# Patient Record
Sex: MALE | Race: WHITE | Employment: UNEMPLOYED | URBAN - METROPOLITAN AREA
[De-identification: names, ages, dates, MRNs, and addresses within clinical notes are randomized per-mention and may not be internally consistent; named-entity substitution may affect disease eponyms.]

---

## 2021-09-16 ENCOUNTER — OFFICE VISIT (OUTPATIENT)
Dept: PRIMARY CARE CLINIC | Age: 31
End: 2021-09-16
Payer: MEDICARE

## 2021-09-16 ENCOUNTER — HOSPITAL ENCOUNTER (EMERGENCY)
Age: 31
Discharge: HOME | End: 2021-09-16
Payer: MEDICARE

## 2021-09-16 VITALS
TEMPERATURE: 96.6 F | DIASTOLIC BLOOD PRESSURE: 60 MMHG | WEIGHT: 144 LBS | OXYGEN SATURATION: 98 % | SYSTOLIC BLOOD PRESSURE: 110 MMHG | BODY MASS INDEX: 21.33 KG/M2 | HEART RATE: 77 BPM | HEIGHT: 69 IN

## 2021-09-16 VITALS
RESPIRATION RATE: 16 BRPM | DIASTOLIC BLOOD PRESSURE: 79 MMHG | SYSTOLIC BLOOD PRESSURE: 127 MMHG | OXYGEN SATURATION: 100 % | WEIGHT: 150 LBS | HEIGHT: 69 IN | HEART RATE: 79 BPM | BODY MASS INDEX: 22.22 KG/M2 | TEMPERATURE: 97.4 F

## 2021-09-16 DIAGNOSIS — S39.012A STRAIN OF LUMBAR REGION, INITIAL ENCOUNTER: Primary | ICD-10-CM

## 2021-09-16 DIAGNOSIS — Z00.00 HEALTHCARE MAINTENANCE: ICD-10-CM

## 2021-09-16 DIAGNOSIS — Z72.0 TOBACCO USE: ICD-10-CM

## 2021-09-16 DIAGNOSIS — M54.6 ACUTE BILATERAL THORACIC BACK PAIN: Primary | ICD-10-CM

## 2021-09-16 LAB
BILIRUBIN URINE: NEGATIVE
BLOOD, URINE: NEGATIVE
CLARITY: CLEAR
COLOR: YELLOW
GLUCOSE URINE: NEGATIVE MG/DL
KETONES, URINE: NEGATIVE MG/DL
LEUKOCYTE ESTERASE, URINE: NEGATIVE
MICROSCOPIC EXAMINATION: NORMAL
NITRITE, URINE: NEGATIVE
PH UA: 6 (ref 5–8)
PROTEIN UA: NEGATIVE MG/DL
SPECIFIC GRAVITY UA: 1.01 (ref 1–1.03)
URINE REFLEX TO CULTURE: NORMAL
URINE TYPE: NORMAL
UROBILINOGEN, URINE: 0.2 E.U./DL

## 2021-09-16 PROCEDURE — 4004F PT TOBACCO SCREEN RCVD TLK: CPT | Performed by: STUDENT IN AN ORGANIZED HEALTH CARE EDUCATION/TRAINING PROGRAM

## 2021-09-16 PROCEDURE — G8427 DOCREV CUR MEDS BY ELIG CLIN: HCPCS | Performed by: STUDENT IN AN ORGANIZED HEALTH CARE EDUCATION/TRAINING PROGRAM

## 2021-09-16 PROCEDURE — 99203 OFFICE O/P NEW LOW 30 MIN: CPT | Performed by: STUDENT IN AN ORGANIZED HEALTH CARE EDUCATION/TRAINING PROGRAM

## 2021-09-16 PROCEDURE — 87491 CHLMYD TRACH DNA AMP PROBE: CPT

## 2021-09-16 PROCEDURE — 99283 EMERGENCY DEPT VISIT LOW MDM: CPT

## 2021-09-16 PROCEDURE — 81003 URINALYSIS AUTO W/O SCOPE: CPT

## 2021-09-16 PROCEDURE — 87591 N.GONORRHOEAE DNA AMP PROB: CPT

## 2021-09-16 PROCEDURE — G8420 CALC BMI NORM PARAMETERS: HCPCS | Performed by: STUDENT IN AN ORGANIZED HEALTH CARE EDUCATION/TRAINING PROGRAM

## 2021-09-16 RX ORDER — BUPROPION HYDROCHLORIDE 150 MG/1
150 TABLET ORAL 2 TIMES DAILY
Qty: 60 TABLET | Refills: 2 | Status: SHIPPED | OUTPATIENT
Start: 2021-09-16 | End: 2021-09-24 | Stop reason: SINTOL

## 2021-09-16 RX ORDER — METHOCARBAMOL 500 MG/1
500 TABLET, FILM COATED ORAL 2 TIMES DAILY
Qty: 28 TABLET | Refills: 0 | Status: SHIPPED | OUTPATIENT
Start: 2021-09-16 | End: 2021-09-30

## 2021-09-16 RX ORDER — METHOCARBAMOL 500 MG/1
500 TABLET, FILM COATED ORAL 2 TIMES DAILY
Qty: 14 TABLET | Refills: 0 | Status: SHIPPED | OUTPATIENT
Start: 2021-09-16 | End: 2021-09-16

## 2021-09-16 SDOH — ECONOMIC STABILITY: FOOD INSECURITY: WITHIN THE PAST 12 MONTHS, YOU WORRIED THAT YOUR FOOD WOULD RUN OUT BEFORE YOU GOT MONEY TO BUY MORE.: NEVER TRUE

## 2021-09-16 SDOH — ECONOMIC STABILITY: FOOD INSECURITY: WITHIN THE PAST 12 MONTHS, THE FOOD YOU BOUGHT JUST DIDN'T LAST AND YOU DIDN'T HAVE MONEY TO GET MORE.: NEVER TRUE

## 2021-09-16 ASSESSMENT — ENCOUNTER SYMPTOMS
BACK PAIN: 1
SHORTNESS OF BREATH: 0
WHEEZING: 0
ABDOMINAL PAIN: 0

## 2021-09-16 ASSESSMENT — PAIN DESCRIPTION - PAIN TYPE: TYPE: ACUTE PAIN

## 2021-09-16 ASSESSMENT — PAIN SCALES - GENERAL: PAINLEVEL_OUTOF10: 2

## 2021-09-16 ASSESSMENT — PATIENT HEALTH QUESTIONNAIRE - PHQ9
1. LITTLE INTEREST OR PLEASURE IN DOING THINGS: 0
2. FEELING DOWN, DEPRESSED OR HOPELESS: 0
SUM OF ALL RESPONSES TO PHQ QUESTIONS 1-9: 0
SUM OF ALL RESPONSES TO PHQ9 QUESTIONS 1 & 2: 0

## 2021-09-16 ASSESSMENT — SOCIAL DETERMINANTS OF HEALTH (SDOH): HOW HARD IS IT FOR YOU TO PAY FOR THE VERY BASICS LIKE FOOD, HOUSING, MEDICAL CARE, AND HEATING?: NOT HARD AT ALL

## 2021-09-16 NOTE — PATIENT INSTRUCTIONS
Patient Education        Back Stretches: Exercises  Introduction  Here are some examples of exercises for stretching your back. Start each exercise slowly. Ease off the exercise if you start to have pain. Your doctor or physical therapist will tell you when you can start these exercises and which ones will work best for you. How to do the exercises  Overhead stretch   1. Stand comfortably with your feet shoulder-width apart. 2. Looking straight ahead, raise both arms over your head and reach toward the ceiling. Do not allow your head to tilt back. 3. Hold for 15 to 30 seconds, then lower your arms to your sides. 4. Repeat 2 to 4 times. Side stretch   1. Stand comfortably with your feet shoulder-width apart. 2. Raise one arm over your head, and then lean to the other side. 3. Slide your hand down your leg as you let the weight of your arm gently stretch your side muscles. Hold for 15 to 30 seconds. 4. Repeat 2 to 4 times on each side. Press-up   1. Lie on your stomach, supporting your body with your forearms. 2. Press your elbows down into the floor to raise your upper back. As you do this, relax your stomach muscles and allow your back to arch without using your back muscles. As your press up, do not let your hips or pelvis come off the floor. 3. Hold for 15 to 30 seconds, then relax. 4. Repeat 2 to 4 times. Relax and rest   1. Lie on your back with a rolled towel under your neck and a pillow under your knees. Extend your arms comfortably to your sides. 2. Relax and breathe normally. 3. Remain in this position for about 10 minutes. 4. If you can, do this 2 or 3 times each day. Follow-up care is a key part of your treatment and safety. Be sure to make and go to all appointments, and call your doctor if you are having problems. It's also a good idea to know your test results and keep a list of the medicines you take. Where can you learn more? Go to https://carmine.[employer]. org and sign in to your MyToons account. Enter Z102 in the [employer] box to learn more about \"Back Stretches: Exercises. \"     If you do not have an account, please click on the \"Sign Up Now\" link. Current as of: November 16, 2020               Content Version: 12.9  © 2006-2021 Healthwise, Incorporated. Care instructions adapted under license by [facility] ([facility]). If you have questions about a medical condition or this instruction, always ask your healthcare professional. [place] [address] any warranty or liability for your use of this information. Patient Education        Stopping Smoking: Care Instructions  Your Care Instructions     Cigarette smokers crave the nicotine in cigarettes. Giving it up is much harder than simply changing a habit. Your body has to stop craving the nicotine. It is hard to quit, but you can do it. There are many tools that people use to quit smoking. You may find that combining tools works best for you. There are several steps to quitting. First you get ready to quit. Then you get support to help you. After that, you learn new skills and behaviors to become a nonsmoker. For many people, a necessary step is getting and using medicine. Your doctor will help you set up the plan that best meets your needs. You may want to attend a smoking cessation program to help you quit smoking. When you choose a program, look for one that has proven success. Ask your doctor for ideas. You will greatly increase your chances of success if you take medicine as well as get counseling or join a cessation program.  Some of the changes you feel when you first quit tobacco are uncomfortable. Your body will miss the nicotine at first, and you may feel short-tempered and grumpy. You may have trouble sleeping or concentrating. Medicine can help you deal with these symptoms. You may struggle with changing your smoking habits and rituals. The last step is the tricky one:  Be prepared for the smoking urge to continue for a time. This is a lot to deal with, but keep at it. You will feel better. Follow-up care is a key part of your treatment and safety. Be sure to make and go to all appointments, and call your doctor if you are having problems. It's also a good idea to know your test results and keep a list of the medicines you take. How can you care for yourself at home? · Ask your family, friends, and coworkers for support. You have a better chance of quitting if you have help and support. · Join a support group, such as Nicotine Anonymous, for people who are trying to quit smoking. · Consider signing up for a smoking cessation program, such as the American Lung Association's Freedom from Smoking program.  · Get text messaging support. Go to the website at www.smokefree. gov to sign up for the [facility] program.  · Set a quit date. Pick your date carefully so that it is not right in the middle of a big deadline or stressful time. Once you quit, do not even take a puff. Get rid of all ashtrays and lighters after your last cigarette. Clean your house and your clothes so that they do not smell of smoke. · Learn how to be a nonsmoker. Think about ways you can avoid those things that make you reach for a cigarette. ? Avoid situations that put you at greatest risk for smoking. For some people, it is hard to have a drink with friends without smoking. For others, they might skip a coffee break with coworkers who smoke. ? Change your daily routine. Take a different route to work or eat a meal in a different place. · Cut down on stress. Calm yourself or release tension by doing an activity you enjoy, such as reading a book, taking a hot bath, or gardening. · Talk to your doctor or pharmacist about nicotine replacement therapy, which replaces the nicotine in your body.  You still get nicotine but you do not use tobacco. Nicotine replacement products help you slowly reduce the amount of nicotine you need. These products come in several forms, many of them available over-the-counter:  ? Nicotine patches  ? Nicotine gum and lozenges  ? Nicotine inhaler  · Ask your doctor about bupropion (Wellbutrin) or varenicline (Chantix), which are prescription medicines. They do not contain nicotine. They help you by reducing withdrawal symptoms, such as stress and anxiety. · Some people find hypnosis, acupuncture, and massage helpful for ending the smoking habit. · Eat a healthy diet and get regular exercise. Having healthy habits will help your body move past its craving for nicotine. · Be prepared to keep trying. Most people are not successful the first few times they try to quit. Do not get mad at yourself if you smoke again. Make a list of things you learned and think about when you want to try again, such as next week, next month, or next year. Where can you learn more? Go to https://ConnectSolutions.[employer]. org and sign in to your [employer] account. Enter E837 in the [employer] box to learn more about \"Stopping Smoking: Care Instructions. \"     If you do not have an account, please click on the \"Sign Up Now\" link. Current as of: February 11, 2021               Content Version: 12.9  © 2006-2021 Healthwise, Incorporated. Care instructions adapted under license by [facility] ([facility]). If you have questions about a medical condition or this instruction, always ask your healthcare professional. [place] [address] any warranty or liability for your use of this information. Wellbutrin dose (May be used by itself or in combination with nicotine replacement therapy):     Initial: 150 mg (1 tablet) once daily for 3 days     Maintenance (day 4 and on): increase to 150 mg twice daily; if cannot tolerate twice a day dose, consider continuing 150 mg once daily    Therapy should begin at least 1 week before target quit date. Target quit dates are generally in the second week of treatment. If patient successfully quits smoking, continue treatment for at least 12 weeks.

## 2021-09-16 NOTE — ASSESSMENT & PLAN NOTE
Patient reports to have tried nicotine patch and lozenges in the past without help with smoking cessation. He is agreeable to try bupropion. Instructions given about how to use it.

## 2021-09-16 NOTE — ASSESSMENT & PLAN NOTE
This is likely secondary to muscle strain. Patient was recommended to take the diclofenac and the Robaxin as prescribed by the ED. Patient was also provided with back stretching exercises he can do in the meantime. Imaging not needed at this time.   PT will be a consideration in the future if symptoms persist.

## 2021-09-16 NOTE — ED PROVIDER NOTES
Cigarettes    Smokeless tobacco: Never Used   Vaping Use    Vaping Use: Every day   Substance and Sexual Activity    Alcohol use: No    Drug use: Yes     Types: Marijuana, Cocaine    Sexual activity: Not on file   Other Topics Concern    Not on file   Social History Narrative    Not on file     Social Determinants of Health     Financial Resource Strain:     Difficulty of Paying Living Expenses:    Food Insecurity:     Worried About Running Out of Food in the Last Year:     Ran Out of Food in the Last Year:    Transportation Needs:     Lack of Transportation (Medical):  Lack of Transportation (Non-Medical):    Physical Activity:     Days of Exercise per Week:     Minutes of Exercise per Session:    Stress:     Feeling of Stress :    Social Connections:     Frequency of Communication with Friends and Family:     Frequency of Social Gatherings with Friends and Family:     Attends Scientologist Services:     Active Member of Clubs or Organizations:     Attends Club or Organization Meetings:     Marital Status:    Intimate Partner Violence:     Fear of Current or Ex-Partner:     Emotionally Abused:     Physically Abused:     Sexually Abused:      No current facility-administered medications for this encounter. Current Outpatient Medications   Medication Sig Dispense Refill    fluticasone (FLONASE) 50 MCG/ACT nasal spray 1 spray by Nasal route daily. 1 Bottle 0     No Known Allergies    REVIEW OF SYSTEMS  6 systems reviewed, pertinent positives per HPI otherwise noted to be negative    PHYSICAL EXAM  /79   Pulse 79   Temp 97.4 °F (36.3 °C) (Oral)   Resp 16   Ht 5' 9\" (1.753 m)   Wt 150 lb (68 kg)   SpO2 100%   BMI 22.15 kg/m²   GENERAL APPEARANCE: Awake and alert. Cooperative. No acute distress. HEAD: Normocephalic. Atraumatic. EYES: PERRL. EOM's grossly intact. ENT: Mucous membranes are moist.   NECK: Supple. Normal ROM. CHEST: Equal symmetric chest rise.    LUNGS: Breathing is unlabored. Speaking comfortably in full sentences. Abdomen: Nondistended and nontender. No rigidity, guarding, rebound. No midline pulsatile mass. BACK: On exam of cervical, thoracic, lumbar spine, there is no swelling, bruising, or color change noted. There is no midline bony tenderness, without crepitus, deformity, or step off. Patient exhibits tenderness of bilateral lumbar paraspinal muscles. Of midline. There is no point tenderness over the SI Joint. Full active range of motion of flexion, extension, lateral bending the back. EXTREMITIES: MAEE. No acute deformities. SKIN: Warm and dry. Rashes, clubbing, or cyanosis. NEUROLOGICAL: Alert and oriented. Strength is 5/5 in all extremities and sensation is intact. Patellar DTRs +2 bilaterally. Patient observed ambulating to the emergency department without difficulty. RADIOLOGY  No results found. CLINICAL CONCERN FOR ACUTE SPINAL EMERGENCIES:   1. Suspicion: Medium-Low Suspicion-evaluate Risk factors   2. Risk Factors:Minor: 0 Major: 0   Score: 0 0-2-No Further Testing       ED COURSE   I have independently evaluated this patient. Patient presented with concerns of bilateral low back pain that is not reproducible, he is primarily asymptomatic while in the emergency department only rates the pain is a 2/10. At this time a do not think urgent care prescription of Pepcid is appropriate at this time. Patient has completely benign physical exam including neurological exam.  Hemodynamically stable. Urinalysis was performed to evaluate for possible cystitis, although he has a benign abdominal pain and no CVA tenderness. Urinalysis without evidence of infection. Due to the patient reporting an oily-like discharge I did order urine gonorrhea chlamydia screening exam.    A discussion was had with the patient regarding urinalysis results.   I advised him to discontinue the Prilosec if he is not experiencing any acid reflux or abdominal discomfort. Informed her that at this time he has no evidence of UTI. He will be treated with diclofenac and Robaxin for presumed muscle strain. I did advise the patient to follow-up with primary care physician in 48 to 72 hours although, it is a Thursday therefore Monday would be appropriate. He has not established with a primary care therefore he will be provided a referral.  Risk management discussed and shared decision making had with patient and/or surrogate. All questions were answered. Patient is given very strict return precautions and patient will return to ED for new/worsening symptoms. Patient was sent home with a prescription for diclofenac and Robaxin. MDM  Results for orders placed or performed during the hospital encounter of 09/16/21   Urinalysis Reflex to Culture    Specimen: Urine, clean catch   Result Value Ref Range    Color, UA Yellow Straw/Yellow    Clarity, UA Clear Clear    Glucose, Ur Negative Negative mg/dL    Bilirubin Urine Negative Negative    Ketones, Urine Negative Negative mg/dL    Specific Gravity, UA 1.010 1.005 - 1.030    Blood, Urine Negative Negative    pH, UA 6.0 5.0 - 8.0    Protein, UA Negative Negative mg/dL    Urobilinogen, Urine 0.2 <2.0 E.U./dL    Nitrite, Urine Negative Negative    Leukocyte Esterase, Urine Negative Negative    Microscopic Examination Not Indicated     Urine Type NotGiven     Urine Reflex to Culture Not Indicated        I estimate there is LOW risk for ABDOMINAL AORTIC ANEURYSM, CAUDA EQUINA SYNDROME, EPIDURAL MASS LESION, SPINAL STENOSIS, OR HERNIATED DISK CAUSING SEVERE STENOSIS, thus I consider the discharge disposition reasonable. Izzy Galeana and I have discussed the diagnosis and risks, and we agree with discharging home to follow-up with their primary doctor. We also discussed returning to the Emergency Department immediately if new or worsening symptoms occur.  We have discussed the symptoms which are most concerning (e.g., saddle anesthesia, urinary or bowel incontinence or retention, changing or worsening pain) that necessitate immediate return. Final Impression    1. Strain of lumbar region, initial encounter        Blood pressure 127/79, pulse 79, temperature 97.4 °F (36.3 °C), temperature source Oral, resp. rate 16, height 5' 9\" (1.753 m), weight 150 lb (68 kg), SpO2 100 %. DISPOSITION  Patient was discharged to home in good condition.           Roger Allen  09/16/21 [id number]

## 2021-09-16 NOTE — PROGRESS NOTES
Mata Ying (:  1990) is a 35 y.o. male,New patient, here for evaluation of the following chief complaint(s):  New Patient (Np to establish ) and Back Pain (pt c/o lower back pain x week)      Patient presents today for ED follow-up. Patient is also here to establish care. Patient was seen in the ED earlier today with complaints of back pain. He was determined to have benign lumbar strain. He was prescribed diclofenac and Robaxin recommended to establish care with a PCP within the next few days. Patient was also assessed for UTI but UA was unremarkable. Patient reports bilateral back pain, worse with movement, starting about a week ago, constant, 3/10 currently (but worse in the morning at night). Patient is a , reporting to lift heavy objects for work, however he is not sure that this is associated with specific movement. Patient also reports upper back muscle spasm and tightness that sometimes radiates to his left shoulder and and arm, intermittent. Patient denies any past medical history. Patient denies any past surgical history. Family history reviewed. Patient reports smoking 0.5 pack of cigarettes a day, denies alcohol drinking, denies recreational drug use. Patient is not monogamous relationship. ASSESSMENT/PLAN:  1. Acute bilateral thoracic back pain  Assessment & Plan: This is likely secondary to muscle strain. Patient was recommended to take the diclofenac and the Robaxin as prescribed by the ED. Patient was also provided with back stretching exercises he can do in the meantime. Imaging not needed at this time. PT will be a consideration in the future if symptoms persist.  2. Healthcare maintenance  -     methocarbamol (ROBAXIN) 500 MG tablet; Take 1 tablet by mouth 2 times daily for 14 days, Disp-28 tablet, R-0Normal  -     CBC Auto Differential; Future  -     COMPREHENSIVE METABOLIC PANEL; Future  3.  Tobacco use  Assessment & Plan:   Patient reports to have tried nicotine patch and lozenges in the past without help with smoking cessation. He is agreeable to try bupropion. Instructions given about how to use it. Orders:  -     buPROPion (WELLBUTRIN XL) 150 MG extended release tablet; Take 1 tablet by mouth 2 times daily, Disp-60 tablet, R-2Normal      Return in about 4 weeks (around 10/14/2021) for back pain follow up / physical .    SUBJECTIVE/OBJECTIVE:    Review of Systems   Constitutional: Negative for fever. Respiratory: Negative for shortness of breath and wheezing. Cardiovascular: Negative for chest pain and palpitations. Gastrointestinal: Negative for abdominal pain. Musculoskeletal: Positive for back pain. Physical Exam  Constitutional:       Appearance: Normal appearance. Cardiovascular:      Rate and Rhythm: Normal rate and regular rhythm. Pulses: Normal pulses. Heart sounds: Normal heart sounds. Pulmonary:      Effort: Pulmonary effort is normal.      Breath sounds: Normal breath sounds. Musculoskeletal:      Comments: No deformities noted, no tenderness to palpation noted   Neurological:      Mental Status: He is alert and oriented to person, place, and time. Comments: Negative Spurling test, normal equal and bilateral sensation in bilateral upper extremities   Psychiatric:         Mood and Affect: Mood normal.         Behavior: Behavior normal.           An electronic signature was used to authenticate this note. Electronically signed by Tiffanie Mckeon MD on 9/16/2021 at 4:51 PM     This dictation was generated by voice recognition computer software. Although all attempts are made to edit the dictation for accuracy, there may be errors in the transcription that are not intended.

## 2021-09-17 LAB
C. TRACHOMATIS DNA ,URINE: NEGATIVE
N. GONORRHOEAE DNA, URINE: NEGATIVE

## 2021-09-24 ENCOUNTER — TELEPHONE (OUTPATIENT)
Dept: PRIMARY CARE CLINIC | Age: 31
End: 2021-09-24

## 2021-09-24 DIAGNOSIS — Z72.0 TOBACCO USE: Primary | ICD-10-CM

## 2021-09-24 RX ORDER — VARENICLINE TARTRATE 1 MG/1
1 TABLET, FILM COATED ORAL 2 TIMES DAILY
Qty: 60 TABLET | Refills: 2 | Status: SHIPPED | OUTPATIENT
Start: 2021-09-24 | End: 2022-05-23

## 2021-09-27 ENCOUNTER — TELEPHONE (OUTPATIENT)
Dept: PRIMARY CARE CLINIC | Age: 31
End: 2021-09-27

## 2021-09-27 NOTE — TELEPHONE ENCOUNTER
Faxed received from [facility] requesting alternative to Chantix - pharmacy states that Chantix is backordered and has also been recalled.

## 2021-09-27 NOTE — TELEPHONE ENCOUNTER
I called the patient back and talked to his wife. She reports that he is going to try continuing to take the patches and / or consider resuming the bupropion and only taking it once a day.  I also suggested some smoking cessation programs, for which we could provide them contact list.     Nahum Johnson MD

## 2021-10-16 PROBLEM — Z00.00 HEALTHCARE MAINTENANCE: Status: RESOLVED | Noted: 2021-09-16 | Resolved: 2021-10-16

## 2022-05-23 ENCOUNTER — OFFICE VISIT (OUTPATIENT)
Dept: PRIMARY CARE CLINIC | Age: 32
End: 2022-05-23
Payer: MEDICARE

## 2022-05-23 ENCOUNTER — NURSE TRIAGE (OUTPATIENT)
Dept: OTHER | Facility: CLINIC | Age: 32
End: 2022-05-23

## 2022-05-23 VITALS
SYSTOLIC BLOOD PRESSURE: 106 MMHG | HEART RATE: 93 BPM | OXYGEN SATURATION: 97 % | WEIGHT: 136 LBS | DIASTOLIC BLOOD PRESSURE: 64 MMHG | BODY MASS INDEX: 20.14 KG/M2 | TEMPERATURE: 97.9 F | HEIGHT: 69 IN

## 2022-05-23 DIAGNOSIS — M54.31 SCIATICA OF RIGHT SIDE: Primary | ICD-10-CM

## 2022-05-23 PROCEDURE — G8427 DOCREV CUR MEDS BY ELIG CLIN: HCPCS | Performed by: STUDENT IN AN ORGANIZED HEALTH CARE EDUCATION/TRAINING PROGRAM

## 2022-05-23 PROCEDURE — 99213 OFFICE O/P EST LOW 20 MIN: CPT | Performed by: STUDENT IN AN ORGANIZED HEALTH CARE EDUCATION/TRAINING PROGRAM

## 2022-05-23 PROCEDURE — 4004F PT TOBACCO SCREEN RCVD TLK: CPT | Performed by: STUDENT IN AN ORGANIZED HEALTH CARE EDUCATION/TRAINING PROGRAM

## 2022-05-23 PROCEDURE — G8420 CALC BMI NORM PARAMETERS: HCPCS | Performed by: STUDENT IN AN ORGANIZED HEALTH CARE EDUCATION/TRAINING PROGRAM

## 2022-05-23 RX ORDER — METHOCARBAMOL 500 MG/1
500 TABLET, FILM COATED ORAL 4 TIMES DAILY
COMMUNITY

## 2022-05-23 RX ORDER — GABAPENTIN 600 MG/1
300 TABLET ORAL NIGHTLY PRN
Qty: 15 TABLET | Refills: 0 | Status: SHIPPED | OUTPATIENT
Start: 2022-05-23 | End: 2022-06-22

## 2022-05-23 RX ORDER — IBUPROFEN 200 MG
200 TABLET ORAL EVERY 6 HOURS PRN
COMMUNITY

## 2022-05-23 ASSESSMENT — ENCOUNTER SYMPTOMS
ABDOMINAL PAIN: 0
BACK PAIN: 1
WHEEZING: 0
SHORTNESS OF BREATH: 0

## 2022-05-23 NOTE — PROGRESS NOTES
Cleophas Lanes (:  1990) is a 28 y.o. male,Established patient, here for evaluation of the following chief complaint(s):  Leg Pain (down right leg x1 week)      Patient presents for same day visit with complaints of leg pain. Patient reports that a week ago he was moving a car motor which was heavy and hurt his back. After 4 days it went away but afterwards started feeling \"shocks moving down his R leg\". This is worse with knee extension and hip flexion. He also experiencing numbness in his R ankle. ASSESSMENT/PLAN:  1. Sciatica of right side  -     gabapentin (NEURONTIN) 600 MG tablet; Take 0.5 tablets by mouth nightly as needed (low back / sciatica) for up to 30 days. , Disp-15 tablet, R-0Normal  Patient's symptoms are suggestive of sciatica, likely from lumbar pinch nerve caused by lifting heavy object 1 week ago. This is likely going to be temporary. Short trial of gabapentin and home back stretching exercises. Return if symptoms worsen or fail to improve. SUBJECTIVE/OBJECTIVE:    Review of Systems   Constitutional: Negative for fever. Respiratory: Negative for shortness of breath and wheezing. Cardiovascular: Negative for chest pain and palpitations. Gastrointestinal: Negative for abdominal pain. Musculoskeletal: Positive for back pain. Neurological: Negative. Vitals: /64   Pulse 93   Temp 97.9 °F (36.6 °C)   Ht 5' 9\" (1.753 m)   Wt 136 lb (61.7 kg)   SpO2 97%   BMI 20.08 kg/m²   Physical Exam  Constitutional:       General: He is not in acute distress. Appearance: Normal appearance. He is not ill-appearing or toxic-appearing. Musculoskeletal:      Comments: No tenderness to palpation on lower back. Positive right straight leg raise test.   Neurological:      Mental Status: He is alert and oriented to person, place, and time.    Psychiatric:         Mood and Affect: Mood normal.         Behavior: Behavior normal.           No results found for this visit on 05/23/22. An electronic signature was used to authenticate this note. Electronically signed by Elder Aschoff, MD on 5/23/2022 at 5:00 PM     This dictation was generated by voice recognition computer software. Although all attempts are made to edit the dictation for accuracy, there may be errors in the transcription that are not intended.

## 2022-05-23 NOTE — LETTER
[facility]  [address] [place] [address]  Phone: [phone]  Fax: [phone]    Arnel Cazares MD        May 23, 2022     Patient: Darshana Malagon   YOB: 1990   Date of Visit: 5/23/2022       To Whom It May Concern:    Darshana Malagon was seen in my office today. It is my medical opinion that Darshana Malagon may return to light duty immediately with the following restrictions: lifting/carrying not to exceed 20 lbs. , pushing/pulling not to exceed 20 lbs., Duration of restrictions (days):  14.    If you have any questions or concerns, please don't hesitate to call.     Sincerely,        Arnel Cazares MD

## 2022-05-23 NOTE — PATIENT INSTRUCTIONS
Patient Education        Sciatica: Care Instructions  Your Care Instructions     Sciatica (say \"jxk-RA-pg-kuh\") is an irritation of one of the sciatic nerves, which come from the spinal cord in the lower back. The sciatic nerves and their branches extend down through the buttock to the foot. Sciatica can develop when an injured disc in the back irritates or presses against a spinal nerve root. Its main symptom is pain, numbness, or weakness that is often worse in the legor foot than in the back. Sciatica often will improve and go away with time. Early treatment usuallyincludes medicines and exercises to relieve pain. Follow-up care is a key part of your treatment and safety. Be sure to make and go to all appointments, and call your doctor if you are having problems. It's also a good idea to know your test results and keep alist of the medicines you take. How can you care for yourself at home?  Take pain medicines exactly as directed. ? If the doctor gave you a prescription medicine for pain, take it as prescribed. ? If you are not taking a prescription pain medicine, ask your doctor if you can take an over-the-counter medicine.  Use heat or ice to relieve pain. ? To apply heat, put a warm water bottle, heating pad set on low, or warm cloth on your back. Do not go to sleep with a heating pad on your skin. ? To use ice, put ice or a cold pack on the area for 10 to 20 minutes at a time. Put a thin cloth between the ice and your skin.  Avoid sitting if possible, unless it feels better than standing.  Alternate lying down with short walks. Increase your walking distance as you are able to without making your symptoms worse.  Do not do anything that makes your symptoms worse. When should you call for help? Call 911 anytime you think you may need emergency care. For example, call if:     You are unable to move a leg at all.    Call your doctor now or seek immediate medical care if:     You have new or worse symptoms in your legs or buttocks. Symptoms may include:  ? Numbness or tingling. ? Weakness. ? Pain.      You lose bladder or bowel control. Watch closely for changes in your health, and be sure to contact your doctor if:     You are not getting better as expected. Where can you learn more? Go to https://chpepiceweb.[employer]. org and sign in to your [email or web address] account. Enter [phone] in the [employer] box to learn more about \"Sciatica: Care Instructions. \"     If you do not have an account, please click on the \"Sign Up Now\" link. Current as of: July 1, 2021               Content Version: 13.2  © 2006-2022 [employer]. Care instructions adapted under license by [employer] [facility] ([facility]). If you have questions about a medical condition or this instruction, always ask your healthcare professional. [place] [address] any warranty or liability for your use of this information. Patient Education        Back Stretches: Exercises  Introduction  Here are some examples of exercises for stretching your back. Start eachexercise slowly. Ease off the exercise if you start to have pain. Your doctor or physical therapist will tell you when you can start theseexercises and which ones will work best for you. How to do the exercises  Overhead stretch    1. Stand comfortably with your feet shoulder-width apart. 2. Looking straight ahead, raise both arms over your head and reach toward the ceiling. Do not allow your head to tilt back. 3. Hold for 15 to 30 seconds, then lower your arms to your sides. 4. Repeat 2 to 4 times. Side stretch    1. Stand comfortably with your feet shoulder-width apart. 2. Raise one arm over your head, and then lean to the other side. 3. Slide your hand down your leg as you let the weight of your arm gently stretch your side muscles. Hold for 15 to 30 seconds. 4. Repeat 2 to 4 times on each side. Press-up    1.  Lie on your stomach, supporting your body with your forearms. 2. Press your elbows down into the floor to raise your upper back. As you do this, relax your stomach muscles and allow your back to arch without using your back muscles. As your press up, do not let your hips or pelvis come off the floor. 3. Hold for 15 to 30 seconds, then relax. 4. Repeat 2 to 4 times. Relax and rest    1. Lie on your back with a rolled towel under your neck and a pillow under your knees. Extend your arms comfortably to your sides. 2. Relax and breathe normally. 3. Remain in this position for about 10 minutes. 4. If you can, do this 2 or 3 times each day. Follow-up care is a key part of your treatment and safety. Be sure to make and go to all appointments, and call your doctor if you are having problems. It's also a good idea to know your test results and keep alist of the medicines you take. Where can you learn more? Go to https://C2C Link.[employer]. org and sign in to your [employer] account. Enter R427 in the [employer] box to learn more about \"Back Stretches: Exercises. \"     If you do not have an account, please click on the \"Sign Up Now\" link. Current as of: July 1, 2021               Content Version: 13.2  © 2006-2022 [employer]. Care instructions adapted under license by [facility] ([facility]). If you have questions about a medical condition or this instruction, always ask your healthcare professional. [place] [address] any warranty or liability for your use of this information.

## 2022-05-23 NOTE — TELEPHONE ENCOUNTER
Received call from [address] [place] [address] at [facility] with Red Flag Complaint. Subjective: Caller states \"My right leg is hurting in the hamstring. It feels like it's pulling. It doesn't hurt all the time but like when I'm bending over or in certain positions. \"    Current Symptoms: R hamstring pain    Onset: 1 week    Associated Symptoms: denies numbness and tingling, admits to some bruising and swelling    Pain Severity: 6/10    Temperature: denies fever    What has been tried: ibuprofen    LMP: NA Pregnant: NA    Recommended disposition: See in Office Today or Tomorrow    Care advice provided, patient verbalizes understanding; denies any other questions or concerns; instructed to call back for any new or worsening symptoms. Patient/Caller agrees with recommended disposition; writer provided warm transfer to [employer] at [facility] for appointment scheduling     Attention Provider: Thank you for allowing me to participate in the care of your patient. The patient was connected to triage in response to information provided to the ECC/PSC. Please do not respond through this encounter as the response is not directed to a shared pool.         Reason for Disposition   Patient wants to be seen    Protocols used: LEG PAIN-ADULT-[place]

## 2022-06-08 ENCOUNTER — TELEPHONE (OUTPATIENT)
Dept: PRIMARY CARE CLINIC | Age: 32
End: 2022-06-08

## 2022-06-08 DIAGNOSIS — M54.31 SCIATICA OF RIGHT SIDE: Primary | ICD-10-CM

## 2022-06-08 NOTE — TELEPHONE ENCOUNTER
I called the number provided and talked to patient's girlfriend Geri Escudero. Patient was seen by me about 2 weeks ago with complaints of left leg pain suggestive of sciatica symptoms (started with back injury). I prescribed him gabapentin and Geri Escudero admits he is not consistent in taking it as prescribed. She reports that Prudence Lopez continues to have leg pain and is requesting for referral to pain specialist. Due to proximity to his residence, she suggested Dr. Pato Prasad. Referral will be faxed to his office.     Zena Richard MD

## 2022-06-08 NOTE — TELEPHONE ENCOUNTER
----- Message from Torie Lopez sent at 6/8/2022  9:35 AM EDT -----  Subject: Message to Provider    QUESTIONS  Information for Provider? Pt would like to know if he needs to be seen for   a Pain Management Dr or if he can just get a referral. Please call pt's   Girlfriend Kenzie Gutierres [phone].   ---------------------------------------------------------------------------  --------------  [address]  What is the best way for the office to contact you? Do not leave any   message, patient will call back for answer  Preferred Call Back Phone Number? [phone]  ---------------------------------------------------------------------------  --------------  SCRIPT ANSWERS  Relationship to Patient?  Self

## 2022-12-24 ENCOUNTER — APPOINTMENT (OUTPATIENT)
Dept: GENERAL RADIOLOGY | Age: 32
End: 2022-12-24
Payer: MEDICARE

## 2022-12-24 ENCOUNTER — HOSPITAL ENCOUNTER (EMERGENCY)
Age: 32
Discharge: HOME | End: 2022-12-24
Attending: EMERGENCY MEDICINE
Payer: MEDICARE

## 2022-12-24 VITALS
DIASTOLIC BLOOD PRESSURE: 70 MMHG | SYSTOLIC BLOOD PRESSURE: 138 MMHG | HEIGHT: 69 IN | BODY MASS INDEX: 20.15 KG/M2 | WEIGHT: 136.02 LBS | HEART RATE: 88 BPM | TEMPERATURE: 98 F | OXYGEN SATURATION: 100 % | RESPIRATION RATE: 18 BRPM

## 2022-12-24 DIAGNOSIS — R44.0 HEARING VOICES: ICD-10-CM

## 2022-12-24 DIAGNOSIS — R00.0 TACHYCARDIA: Primary | ICD-10-CM

## 2022-12-24 DIAGNOSIS — F15.10 METHAMPHETAMINE USE (HCC): ICD-10-CM

## 2022-12-24 LAB
A/G RATIO: 1.8 (ref 1.1–2.2)
ACETAMINOPHEN LEVEL: <5 UG/ML (ref 10–30)
ALBUMIN SERPL-MCNC: 4.6 G/DL (ref 3.4–5)
ALP BLD-CCNC: 96 U/L (ref 40–129)
ALT SERPL-CCNC: 25 U/L (ref 10–40)
AMPHETAMINE SCREEN, URINE: POSITIVE
ANION GAP SERPL CALCULATED.3IONS-SCNC: 10 MMOL/L (ref 3–16)
AST SERPL-CCNC: 19 U/L (ref 15–37)
BARBITURATE SCREEN URINE: ABNORMAL
BASOPHILS ABSOLUTE: 0 K/UL (ref 0–0.2)
BASOPHILS RELATIVE PERCENT: 0.5 %
BENZODIAZEPINE SCREEN, URINE: ABNORMAL
BILIRUB SERPL-MCNC: <0.2 MG/DL (ref 0–1)
BILIRUBIN URINE: NEGATIVE
BLOOD, URINE: NEGATIVE
BUN BLDV-MCNC: 16 MG/DL (ref 7–20)
CALCIUM SERPL-MCNC: 9.5 MG/DL (ref 8.3–10.6)
CANNABINOID SCREEN URINE: ABNORMAL
CHLORIDE BLD-SCNC: 102 MMOL/L (ref 99–110)
CLARITY: CLEAR
CO2: 28 MMOL/L (ref 21–32)
COCAINE METABOLITE SCREEN URINE: ABNORMAL
COLOR: YELLOW
CREAT SERPL-MCNC: 0.8 MG/DL (ref 0.9–1.3)
EKG ATRIAL RATE: 105 BPM
EKG DIAGNOSIS: NORMAL
EKG P AXIS: 78 DEGREES
EKG P-R INTERVAL: 152 MS
EKG Q-T INTERVAL: 324 MS
EKG QRS DURATION: 88 MS
EKG QTC CALCULATION (BAZETT): 428 MS
EKG R AXIS: 92 DEGREES
EKG T AXIS: 63 DEGREES
EKG VENTRICULAR RATE: 105 BPM
EOSINOPHILS ABSOLUTE: 0 K/UL (ref 0–0.6)
EOSINOPHILS RELATIVE PERCENT: 0.6 %
ETHANOL: NORMAL MG/DL (ref 0–0.08)
FENTANYL SCREEN, URINE: ABNORMAL
GFR SERPL CREATININE-BSD FRML MDRD: >60 ML/MIN/{1.73_M2}
GLUCOSE BLD-MCNC: 107 MG/DL (ref 70–99)
GLUCOSE URINE: NEGATIVE MG/DL
HCT VFR BLD CALC: 42.7 % (ref 40.5–52.5)
HEMOGLOBIN: 14.7 G/DL (ref 13.5–17.5)
INFLUENZA A: NOT DETECTED
INFLUENZA B: NOT DETECTED
KETONES, URINE: NEGATIVE MG/DL
LEUKOCYTE ESTERASE, URINE: NEGATIVE
LYMPHOCYTES ABSOLUTE: 1.7 K/UL (ref 1–5.1)
LYMPHOCYTES RELATIVE PERCENT: 22.7 %
Lab: ABNORMAL
MCH RBC QN AUTO: 30.7 PG (ref 26–34)
MCHC RBC AUTO-ENTMCNC: 34.4 G/DL (ref 31–36)
MCV RBC AUTO: 89.4 FL (ref 80–100)
METHADONE SCREEN, URINE: ABNORMAL
MICROSCOPIC EXAMINATION: NORMAL
MONOCYTES ABSOLUTE: 0.7 K/UL (ref 0–1.3)
MONOCYTES RELATIVE PERCENT: 9.9 %
NEUTROPHILS ABSOLUTE: 5 K/UL (ref 1.7–7.7)
NEUTROPHILS RELATIVE PERCENT: 66.3 %
NITRITE, URINE: NEGATIVE
OPIATE SCREEN URINE: ABNORMAL
OXYCODONE URINE: ABNORMAL
PDW BLD-RTO: 12.9 % (ref 12.4–15.4)
PH UA: 7
PH UA: 7 (ref 5–8)
PHENCYCLIDINE SCREEN URINE: ABNORMAL
PLATELET # BLD: 289 K/UL (ref 135–450)
PMV BLD AUTO: 7.2 FL (ref 5–10.5)
POTASSIUM REFLEX MAGNESIUM: 4.3 MMOL/L (ref 3.5–5.1)
PROTEIN UA: NEGATIVE MG/DL
RBC # BLD: 4.78 M/UL (ref 4.2–5.9)
SALICYLATE, SERUM: <0.3 MG/DL (ref 15–30)
SARS-COV-2 RNA, RT PCR: NOT DETECTED
SODIUM BLD-SCNC: 140 MMOL/L (ref 136–145)
SPECIFIC GRAVITY UA: <=1.005 (ref 1–1.03)
TOTAL PROTEIN: 7.1 G/DL (ref 6.4–8.2)
TROPONIN: <0.01 NG/ML
URINE REFLEX TO CULTURE: NORMAL
URINE TYPE: NORMAL
UROBILINOGEN, URINE: 0.2 E.U./DL
WBC # BLD: 7.6 K/UL (ref 4–11)

## 2022-12-24 PROCEDURE — 71045 X-RAY EXAM CHEST 1 VIEW: CPT

## 2022-12-24 PROCEDURE — 85025 COMPLETE CBC W/AUTO DIFF WBC: CPT

## 2022-12-24 PROCEDURE — 82077 ASSAY SPEC XCP UR&BREATH IA: CPT

## 2022-12-24 PROCEDURE — 93005 ELECTROCARDIOGRAM TRACING: CPT | Performed by: EMERGENCY MEDICINE

## 2022-12-24 PROCEDURE — 84484 ASSAY OF TROPONIN QUANT: CPT

## 2022-12-24 PROCEDURE — 80307 DRUG TEST PRSMV CHEM ANLYZR: CPT

## 2022-12-24 PROCEDURE — 80179 DRUG ASSAY SALICYLATE: CPT

## 2022-12-24 PROCEDURE — 81003 URINALYSIS AUTO W/O SCOPE: CPT

## 2022-12-24 PROCEDURE — 6370000000 HC RX 637 (ALT 250 FOR IP): Performed by: EMERGENCY MEDICINE

## 2022-12-24 PROCEDURE — 80053 COMPREHEN METABOLIC PANEL: CPT

## 2022-12-24 PROCEDURE — 93010 ELECTROCARDIOGRAM REPORT: CPT | Performed by: INTERNAL MEDICINE

## 2022-12-24 PROCEDURE — 99285 EMERGENCY DEPT VISIT HI MDM: CPT

## 2022-12-24 PROCEDURE — 80143 DRUG ASSAY ACETAMINOPHEN: CPT

## 2022-12-24 PROCEDURE — 36415 COLL VENOUS BLD VENIPUNCTURE: CPT

## 2022-12-24 PROCEDURE — 87636 SARSCOV2 & INF A&B AMP PRB: CPT

## 2022-12-24 RX ORDER — LORAZEPAM 1 MG/1
1 TABLET ORAL ONCE
Status: COMPLETED | OUTPATIENT
Start: 2022-12-24 | End: 2022-12-24

## 2022-12-24 RX ADMIN — LORAZEPAM 1 MG: 1 TABLET ORAL at 04:48

## 2022-12-24 ASSESSMENT — ENCOUNTER SYMPTOMS
VOMITING: 0
SHORTNESS OF BREATH: 0
NAUSEA: 0
COUGH: 0

## 2022-12-24 NOTE — ED NOTES
Level of Care Disposition: Discharge     Patient was seen by ED provider and [facility] AN AFFILIATE OF [facility] staff. The case was presented to psychiatric provider on-call HEMA Graf. Based on the ED evaluation and information presented to the provider by Jonathon Gonzalez , it is the recommendation of the on call psychiatric provider that the patient be discharged from a psychiatric standpoint with the following referrals: Out pt mental health counseling referrals and substance abuse referrals.            John CHAVEZ

## 2022-12-24 NOTE — DISCHARGE INSTRUCTIONS
The crisis number for [facility] is [phone] (SAVE). This crisis line is available 24 hours a day, seven days a week. Text HOME to [phone] from anywhere in the [place], anytime, about any type of crisis. Crisis Text Line serves anyone, in any type of crisis, providing access to free, 24/7. The first two responses are automated. They tell you that you're being connected with a Crisis Counselor, and invite you to share a bit more. The Crisis Counselor is a trained volunteer, not a professional.  It usually takes less than five minutes to connect you with a Crisis Counselor. The goal of any conversation is to get you to a calm, safe place. Outpatient Mental Health Treatment Services    Mental Health Therapy and Psychiatry (Medication Management)  [facility]  Location: [address]  Phone: [phone]    Dr. [facility]  Location: [facility] in [place] [address] [address], [address].     Phone: [phone]    [address] [place] [address]  Location: Multiple offices in the [facility]  Phone: [phone] or [address]  Locations: Multiple locations in [place] and [place]  Phone: [phone]    [facility]  Location: 49 [facility]  Phone: [address]  Location: Multiple offices in [place]   Phone: Magan Figueroa ([phone])    [address]  Location: [facility] PSYCHIATRIC REHABILITATION [address]  Phone: [phone] ([phone])    [address] [facility]  Location: 74 [facility], [address]  Phone: [phone]    [facility]  Location: offices in [address] [place] [address]  Phone: [address]  Location: [place], [place]  Phone: [phone]    Dr. Thaddeus Garduno   Location: [address]    Phone: 19 Mar [facility]  Location: [address] [facility], [address]. Phone: [phone]    Mental Health Therapy Only, No Psychiatry (Medication Management)    [facility]  Location: Diego [facility], [address]  Phone: [phone] Savanah  Location: [address] [facility] 132, Lisa, Jason1 Stanley Man  Phone: [phone]     Detox Only- (Treatment should be coordinated prior)    [address] - [phone]  1139 [facility]. Leo Gonzalez 48    Detox Included in [address]  [address], 1648 [facility] 105 Medicaid, will take [facility]. residents on a fee-only basis if no insurance  Inpatient detox for men and women  Maryse Bradley- [phone]  ext. [address] [place] [address] Dr  Accepts Medicaid  Inpatient detox for men and women  [facility] [phone]  [address]  [place], [place]. [address]  Private and Medicaid  Ambulatory Detox for men  The 83 [facility]- 911 [facility], [address] [place] [address] Ave [address]  Private Insurance only  Inpatient detox  [place]- [phone]  Hans Zavala, 800 [facility]  Private insurance only, although will accept those with Medicaid aged 18-20  Inpatient detox  [facility]- [phone], [phone] Admissions  555 N [facility] Alex Caldwell. [address]  Most forms of Medicaid accepted, plus private insurances  Inpatient detox  The 83 [facility]- [phone] or [address] [facility], [address]  Most insurance accepted and self-pay rates available  Inpatient detox  [facility]-[phone]  7000 [facility] Katarina Dixon 80  Medicaid accepted  Dual diagnosis treatment with medication management  Pregnant women accepted  Provides transportation to facility   Inpatient detox        [address]  126 [place], [place] 95 [place] and other forms of insurance  [address] [place] [facility] [address]  1250 [facility], 24 Mar Zimmerman  Accepts Medicaid and other forms of insurance  [facility]- [phone]  4011 S [facility].  Purnima Du, [address]  Accepts Medicaid and other forms of insurance  Maryse Bradleyo- [phone] , [phone]  Lylye 9, 720 [facility]  Accepts Medicaid and other forms of insurance  [facility] 99- [phone]  117 [facility], 103 [facility]  Accepts Medicaid and other forms of insurance- can bring your children  Nadine Gonzalez, 4248 [facility]  Takes Medicaid and [place] Mom. residents on a fee-only scale  [place]- [phone]  [address] [facility], [address]  Private insurance only, although will accept those with Medicaid aged 18-20  The [address]  [address] [facility]  Accepts Medicaid and other forms of insurance- can bring your children  The [address] [facility] [address]- [phone], [phone], [phone]  [facility]8 [address]  Most insurances accepted  [facility]- [phone], [phone] Admissions  555 N [facility], Ul[address]  Most forms of Medicaid accepted, plus [address]- [phone]  [facility] MID-NOVEMBER 2019)  [address] [place] [address], Marke, [facility]   Some Medicaid and most private insurances accepted  [facility]- [phone], [phone] Direct Cell  2000 [facility]  Medicare and most private insurances accepted - Mental health w/ co-occuring 6[address] [place]  3782 [facility]  [address] Based and free  [employer]- Women- [phone] 6037 Landen Ragland TY [facility], [facility], 205 [facility]  No cost, work program, tonya based        [address]- 1302 [facility], Svel[address] 55  Accepts Medicaid and other forms of insurance  [address]  [address] [facility] [address]. Lisa, [address] [facility] [address]  Accepts Medicaid and other forms of insurance  [facility]- [phone]  [address] [facility] [address] [place], [address] [facility] [address]  Accepts Medicaid and other forms of insurance  Maryse diane Tedo- [phone] , [phone]  Lylye 9, 720 [facility]  Accepts Medicaid and other forms of insurance  Jose Gonzalez, [address]  Takes Medicaid and Tommas Signs. residents on a fee-only scale  The [address] [phone]  [facility] Life and Recovery-  [phone] [place], [place] [address]  Flakita based and accepts [facility]  Recovery Works-  [phone]  [address] [place] [address], 1025 [facility]  Medicaid and Private insurance accepted  [place]- [phone]  Aydee Query Dr. Dennis Kocher, [address]  Private insurance only, although will accept those with Medicaid aged 20-19  The [address] [facility] [address]- [phone], [phone], [phone]  [facility]8 [address]  Most insurances accepted  [facility]- [phone], [phone] Admissions  555 N [facility], [address]  Most forms of Medicaid accepted, plus 300 [facility] Street- [phone]  [facility] MID-NOVEMBER 2019)  [address] [place] [address], [place], [address] [place] [address] Dr  Some Medicaid and most private insurances accepted  [facility]- [phone], 35744 [facility] [address], [facility]  Medicare and most private insurances accepted - Mental health w/ co-occuring OLIVIER  525 [facility]- [phone]  [facility]. Rey Gonzalez [place] [address]  No cost, work program, [place] based  [place]aven  [phone]  94 S. 4050 [facility], [address] [place] [address] Flakita based and free  15 [facility], [address] Based and Free  Teen [employer] [address]  Flakita Based and free       [facility] [phone]  [address]. [address]8Th Floor [address], 99 [facility]  Men and women  [facility] AT [facility] [phone]  [address] Ava Louis, [facility] Joy  Men and women  [place]- [phone]  1650 [facility]. Alex Zavala. [address]  Men and women  [facility]- [phone]  ([address] [address] 285 2019)  7821 [place] [address], [address]  Some Medicaid and most private insurances accepted  [facility]- [phone], [phone] Direct Cell  2000 [facility]3]  Medicare and most private insurances accepted - Mental health w/ co-occuring OLIVIER  The 83 [facility] [address]- [address]  Dual-diagnosis, men and women  [facility] By Nora - [phone]  2540 [facility]. [employer], [place]. [address]  Men and Women      Outpatient Only    HOSP [facility]- [phone]  Ciaran 9 [place], [facility]  Accepts Medicaid and other forms of insurance  [facility] [phone]  [facility] 812. [address],8Th Floor 120  [address]  Accepts Medicaid and other forms of insurance, does ambulatory detox  Zoie Duenas- [phone]  Marie Franco 473, [place], [address] private insurance, [place], assists with Carmen 22- [phone]  ( will guide)  [address] [place] [address]. 1760 [place] [address]  Accepts Medicaid and private insurance  Ori Ulloa 123 [phone]   5 [facility] [address] [place] [address], [address] [place] [address]  Private insurance only at this time  Harpreet loredo [phone]  459 E [facility], 1648 Angela Couch  Takes Medicaid and Noble Espinosa. residents on a fee-only scale  [facility] - [phone]  0 SMITA Gudino Joycelyn Mixer, 333 [facility]  1901 [facility]. [address] [facility] [address] [facility] [phone]  [address] [place] [address], [address] [address]  Accepts Medicaid, serves Gabrielle Mustafa and [facility]/[employer] residents on a sliding fee scale  MaLili\[facility]\"" [phone]  [address] Kris Galarza [address]  Mar Houston [address], 727 [facility]  No Medicaid, No Justice Ponto or SANDNES, all other private insurances accepted  [facility]- [phone]  [address] [facility] [address] Delisa Terry, Diego Joy  Private insurance only  [place] - [phone]  [address] 206 [facility] [address]  Private insurance only, although will accept those with Medicaid aged 18-20  Changes of Angeline Banda - [phone]  Chula Courser  [facility] [address]  Private insurance only, although will accept those with Medicaid aged [address] [place]  [facility]- [phone] 24552 [facility], [address] [place] [address],9D  Men only, no children  [facility]- [phone]  [facility] 53. 43548 [facility]   Women only, no children  [employer] [facility] and [address] [place] [address] Road- [phone] 3333 [place] [address] 1, [place], 401 [facility]  Women only, no children  [facility]  [phone]   [address], [place], 401 [facility]  Men only, no children  [place] [phone]  [facility].  [place], 401 [facility]  Separate mens and womens housing, no children   [facility]- [phone]  [address], 1648 [place]  Men only, no children  [place]/The Nest- [phone]  Mens- in DaySara sexton 30- in [place], [place] and [place], [place]  [place]- [phone]  Multiple locations in [facility]- [phone]  Alex 89, [facility] 429  Women only, can potentially bring kids  866 [facility]- [phone]  John Roberts 5304, 100 [facility]-based [facility], separate mens and womens housing, no children  [address] 541- [phone]  [address], [address]  Women only, tonya-based, trauma-focused, no children  Sriamy Mclaughlin- [phone]  Jocelyn 61 1200 [facility] [phone]  [address], [place], [address]  tonya based, women only  [facility]-   4142 IJVITO[facility]erson Hospital HWPQVUPNIU RQ [address]  Men and Women  [place]- [address] 520 [facility], ToshiaCarondelet [facility] 48   Males only  [place]-   [address]674 [facility] [address], 411 [place]   Male veterans only  Substance [address] [place] v[address]- [phone]  Women only, may allow MAT      Narcan    Narcan/Naloxone 1 [facility]- [phone]  Perbalbina 9 [place], [facility]     Safer Medication Pippa Ronan  [address] [place], [facility]  Options include: personal medication lock bags, personal medication lock boxes, Deterra medication disposal bags, and more. Prescription Drop Off Locations    [facility] Police Department- [phone]  63 W. [facility] 49 [address]  [address] [address]  [address] [place] [address]- [phone] [address], 6[address]  2201 [facility]- [phone]  120 [address] 350 [facility], 100 [facility] [phone]  101 [facility]. Ephraim Murguia 82  [address] Harinder Zid  1917 [facility]. [address] Police Department- [phone]  202 S.  2475 [facility], [address]  [facility]- [phone] 7601 [address], [address] [place] [address]- [phone]  9801 QOGPWCAS , [place], [facility]- [phone]  [address] [place] [address] Meetings  Ricardo Ra. org  NA Meetings  RomanticInfo.fr. org  SMART Recovery  www.smartrecovery. org  Celebrate Recovery  http://. crgroups. info/    Peer Support Groups  Held at [facility], although you are not required to be a client   Lamhyun 9 [place], [facility]    Traditional Peer Support  Monday 12:30pm-1:30pm  Monday 5pm-6pm  Wednesday 5pm-6pm  Fresh Start- A re-entry focused group  Wednesday 2:30-4PM    Support for the Family    RAYSHAWN (Surviving Our Loss And Continuing Everyday)  Meet 2nd and 4th Wednesday of the month at [facility]  [address] 15 Theletra, 2300 Hoda Curjuwan [facility],5Th Floor    PAL (Parents of Addicted Indio Johnson)  Monday 6:30-8pm VERONICA DESAI [facility] Room D  307 [facility], 2501 [facility]  Monday 6:30-8pm [facility]  8026 Arch[facility] Memorial Hospitalblank [place], [address]  Tuesday 6:30pm-8pm [facility] ED Conference Room   [facility] SPECIALTY [facility]. [place], [address]  Tuesday 7-8:30pm [facility] Mother Room  316 [facility] 1878, 2501 [facility]    Al-Anon/Al-Ateen  www. [facility]-anon. org [facility])  Jeffery.. org ([place])    Pato Sawant 15  (Resources are not guaranteed and are fluctuating)  [facility]- [phone]   [address] 600 [facility],Third Floor  Case by case assistance for rent, utilities, deposits, IDs. On line application @ www. Louisohkaylee. [address] [place] [address] [phone]  [facility]   Periodically offer $10.00 [employer] cards for food only. [facility] [phone]   [facility] 76 Cora Edouard U. 56. [address]  Meet @ [facility] on Wednesday evenings 6:45pm. Ask for a deacon, talk with them briefly, go to the service and meet with deacon after the service. Must provide rent or utility information so they can pay directly. Some gift cards available.   [place]- [phone] ext.[phone]  [place]. Frequently a wide selection in their food pantry. They have given out vouchers in the past for clothing and furniture. [employer]: [phone]   [facility]. Mostly food and clothing. They also help with back to school and [place] assistance. Sign up in July and September. They know where the mobile food pantries go. [address] [facility] [phone]  [facility]. Area and school district. Leave message. They mostly help with food and clothing. Call for specific times they are open. 5025 [facility],[phone]   [facility] 6 only. Leave message with name, address and phone number where you can be reached in 1-2 business days. Braxton Ventura- [phone] ext. 315 [facility]. [facility] Do [address]. Financial assistance is very limited. Mostly food help. They sometimes deliver food, especially for shut ins. Leave name, address, and phone number. 701 [facility]. Leave name, number and a brief message. Able to help with rent utilities food and clothing. Braxton Shah/Saint [facility] Financial of [address]- [phone] ext. [facility] Leave name,number,address  [facility]- [phone] ext. 1995 Lists of hospitals in the [place]. Food pantry, limited clothing, furniture vouchers  [facility] [phone] Ext. 901 Paco Lopes. Leave name and number. [address] [place] [address]. Help with rent, utilities, food  Shreita Pruitt- [phone]  [facility]. Clothing vouchers, food cards, some assistance getting prescriptions. Help with rent and utilities. [address] [phone] [address] 16. [place] [address]  Help with getting State IDs and birth certificates. Call for specific dates and times. All other assistance must go through Conferences or area [facility].   [employer]- 235 [facility]. Help with rent and utilities BUT ONLY IF YOU HAVE A SHUT OFF NOTICE OR EVICTION. They may help with getting birth certificates and IDS. New Eucha Siperian [facility]- [phone] ext. 3     Aura@TATE'[email or web address] LIST. At their website click on assistance. Under financial assistance is an application. Application must be complete or it will not be considered. Applications are reviewed on the 15th and 30th of each month. Limit $100.00 per family per year  [facility]- [phone]  Tuesday, Wednesday, Thursday: 10am-1pm. Mostly help with food. Some help with rent, utilities.
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
## 2022-12-24 NOTE — ED NOTES
Presenting Problem:Patient presents to ED voluntarily after having chest pain, anxiety, and hearing voices. Pt was placed on a SOB by ED MD.   Pt reports he came into the ED for his heart and chest pain. Pt states his neighbors have been harassing him and states he can hear their voices telling him to leave. Pt states he is a registered sex offender and that is why they want him to leave. Pt states they will yell at him. Pt states he can hear them through his I pod headphones and also states he can only hear them in certain places. Pt states the neighbors follow him as well. He reports this gives him anxiety and states he is always nervous. Pt reports he uses crystal meth. Pt reports he has been using crystal meth for the past couple of months. He reports he uses it a couple times a week and states he last used it a few days ago. Pt states he also drinks 3-4 red bulls a day. He reports he is trying to cut back on crystal meth, red bull, and cigarettes. Pt denies suicidal ideations, plan, and intent. Pt denies homicidal ideations. Pt reports he feels safe going home if discharged. Pt states he just wants something to help him calm down and to help with stress. Appearance/Hygiene:  well-appearing, lying in bed, fair grooming, and fair hygiene   Motor Behavior: WNL   Attitude: cooperative  Affect: normal affect   Speech: normal pitch and normal volume  Mood: anxious and within normal limits   Thought Processes: Gideon  Perceptions: Pt states he is hearing the neighbors voices telling him to leave the apartment complex due to him being a registered sex offender. Pt states he can hear them through his I pod headphones and also states he can only hear them in certain places.    Thought content: Future oriented   Orientation: A&Ox4   Memory: intact  Concentration: Good    Insight/ judgement: impaired judgment and insight       Psychosocial and contextual factors: Pt lives in an apartment with his girlfriend of ten years. He reports he works for himself as a . Pt states he is a registered sex offender and was released from detention in 2017. Pt states he has a small support system. He reports his appetite has remained normal for him. He reports his sleep is poor and wakes up through out the night. C-SSRS flowsheet is  Complete. Psychiatric History (including current outpatient provider and past inpatient admissions): Pt reports he has no prior psych admissions. Pt states he is currently not connected with any out pt mental health resources.      Access to Firearms: denies     ASSESSMENT FOR IMMINENT FUTURE DANGER:    RISK FACTORS:    []  Age <25 or >49   []  Male gender   []  Depressed mood   []  Active suicidal ideation   []  Suicide plan   []  Suicide attempt   [x]  Access to lethal means   []  Prior suicide attempt   [x]  Active substance abuse (Pt states he uses crystal meth a couple times a week and last used a couple days ago. )   [x]  Highly impulsive behaviors   []  Not attending to self-care/ADLs    []  Recent significant loss   []  Chronic pain or medical illness   []  Social isolation   []  History of violence    []  Active psychosis   []  Cognitive impairment    []  No outpatient services in place   []  Medication noncompliance   []  No collateral information to support safety  [] Self- injurious/ Self-harm behavior    PROTECTIVE FACTORS:  [x] Age >25 and <55  [] Female gender   [] Denies depression  [x] Denies suicidal ideation  [x] Does not have lethal plan   [] Does not have access to guns or weapons  [x] Patient is verbally neel for safety  [x] No prior suicide attempts  [] No active substance abuse  [x] Patient has social or family support  [] No active psychosis or cognitive dysfunction  [x] Physically healthy  [] Has outpatient services in place  [] Compliant with recommended medications  [x] Collateral information from pt's girlfriend  supports patient safety   [x] 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

## 2022-12-24 NOTE — Clinical Note
Jacqueline Schmidt was seen and treated in our emergency department on 12/24/2022. He may return to work on 12/26/2022. If you have any questions or concerns, please don't hesitate to call.       [address] [facility], DO

## 2022-12-26 ENCOUNTER — HOSPITAL ENCOUNTER (EMERGENCY)
Age: 32
Discharge: HOME | End: 2022-12-26
Attending: EMERGENCY MEDICINE
Payer: MEDICARE

## 2022-12-26 VITALS
SYSTOLIC BLOOD PRESSURE: 133 MMHG | DIASTOLIC BLOOD PRESSURE: 86 MMHG | HEART RATE: 82 BPM | OXYGEN SATURATION: 100 % | WEIGHT: 136 LBS | RESPIRATION RATE: 16 BRPM | TEMPERATURE: 97.8 F | HEIGHT: 69 IN | BODY MASS INDEX: 20.14 KG/M2

## 2022-12-26 DIAGNOSIS — R00.2 PALPITATIONS: ICD-10-CM

## 2022-12-26 DIAGNOSIS — M25.512 ACUTE PAIN OF LEFT SHOULDER: Primary | ICD-10-CM

## 2022-12-26 LAB
EKG ATRIAL RATE: 78 BPM
EKG DIAGNOSIS: NORMAL
EKG P AXIS: 64 DEGREES
EKG P-R INTERVAL: 144 MS
EKG Q-T INTERVAL: 358 MS
EKG QRS DURATION: 84 MS
EKG QTC CALCULATION (BAZETT): 408 MS
EKG R AXIS: 85 DEGREES
EKG T AXIS: 62 DEGREES
EKG VENTRICULAR RATE: 78 BPM

## 2022-12-26 PROCEDURE — 93005 ELECTROCARDIOGRAM TRACING: CPT | Performed by: EMERGENCY MEDICINE

## 2022-12-26 PROCEDURE — 96372 THER/PROPH/DIAG INJ SC/IM: CPT

## 2022-12-26 PROCEDURE — 93010 ELECTROCARDIOGRAM REPORT: CPT | Performed by: INTERNAL MEDICINE

## 2022-12-26 PROCEDURE — 6360000002 HC RX W HCPCS: Performed by: EMERGENCY MEDICINE

## 2022-12-26 PROCEDURE — 99284 EMERGENCY DEPT VISIT MOD MDM: CPT

## 2022-12-26 PROCEDURE — 6370000000 HC RX 637 (ALT 250 FOR IP): Performed by: EMERGENCY MEDICINE

## 2022-12-26 RX ORDER — IBUPROFEN 600 MG/1
600 TABLET ORAL 4 TIMES DAILY PRN
Qty: 40 TABLET | Refills: 0 | Status: SHIPPED | OUTPATIENT
Start: 2022-12-26 | End: 2022-12-29

## 2022-12-26 RX ORDER — LIDOCAINE 50 MG/G
1 PATCH TOPICAL DAILY
Qty: 30 PATCH | Refills: 0 | Status: SHIPPED | OUTPATIENT
Start: 2022-12-26 | End: 2022-12-29

## 2022-12-26 RX ORDER — LIDOCAINE 4 G/G
1 PATCH TOPICAL ONCE
Status: DISCONTINUED | OUTPATIENT
Start: 2022-12-26 | End: 2022-12-26 | Stop reason: HOSPADM

## 2022-12-26 RX ORDER — KETOROLAC TROMETHAMINE 30 MG/ML
30 INJECTION, SOLUTION INTRAMUSCULAR; INTRAVENOUS ONCE
Status: COMPLETED | OUTPATIENT
Start: 2022-12-26 | End: 2022-12-26

## 2022-12-26 RX ORDER — LIDOCAINE 4 G/G
1 PATCH TOPICAL DAILY
Status: DISCONTINUED | OUTPATIENT
Start: 2022-12-26 | End: 2022-12-26

## 2022-12-26 RX ADMIN — KETOROLAC TROMETHAMINE 30 MG: 30 INJECTION, SOLUTION INTRAMUSCULAR; INTRAVENOUS at 02:24

## 2022-12-26 ASSESSMENT — PAIN SCALES - GENERAL
PAINLEVEL_OUTOF10: 4
PAINLEVEL_OUTOF10: 5

## 2022-12-26 ASSESSMENT — ENCOUNTER SYMPTOMS
VOMITING: 0
NAUSEA: 0
SHORTNESS OF BREATH: 0
COUGH: 0

## 2022-12-26 ASSESSMENT — PAIN - FUNCTIONAL ASSESSMENT: PAIN_FUNCTIONAL_ASSESSMENT: 0-10

## 2022-12-26 ASSESSMENT — PAIN DESCRIPTION - PAIN TYPE: TYPE: ACUTE PAIN

## 2022-12-26 ASSESSMENT — PAIN DESCRIPTION - DESCRIPTORS: DESCRIPTORS: NUMBNESS;SORE

## 2022-12-26 ASSESSMENT — PAIN DESCRIPTION - LOCATION: LOCATION: ANKLE;SHOULDER

## 2022-12-26 NOTE — ED NOTES
Pt has d/c order. D/C instructions given. Prescriptions given. Pt verbalized understanding. Pt out to lobby.          Davide North RN  12/26/22 9319

## 2022-12-26 NOTE — ED TRIAGE NOTES
Pt states that he worked out 4 days ago and he came into ED on 12/24. Today he is having soreness in his left arm and shoulder and \"he feels a current through his body and his arm goes to sleep\".

## 2022-12-26 NOTE — ED PROVIDER NOTES
Emergency Department Attending Physician Note  Location: [place] [address] ED  12/26/2022       Pt Name: Leena Kim  MRN: [id number]  [place] 1990    Date of evaluation: 12/26/2022  Provider: Akua Payne DO  PCP: Dori Blunt MD    Note Started: 4:36 AM EST 12/26/22    CHIEF COMPLAINT:  Chief Complaint   Patient presents with    Arm Pain       HISTORY OF PRESENT ILLNESS:  History obtained by patient. Limitations to history : None. Leena Kim is a 33 y.o. male with a significant PMHx of periodic methamphetamine use, recently seen in this emergency department by myself 2 nights ago with what seemed to be methamphetamine induced psychosis, presenting emergency department today much more alert, conversational, calm, and speaking much more clearly, concerned with left shoulder pain. Patient says he was working out, doing push-ups, and now has pain, pointing to what appears to be the pectoralis and biceps tendon area towards his left arm. No numbness, weakness, tingling, but sometimes says he gets some tingling in his feet and his hands. He states that he is still having some palpitations, but not nearly as bad as the other day. No other concerns today in the emergency department. He is alert, oriented, conversational.  No chest pain, shortness of breath, syncope. Nursing Notes were all reviewed and agreed with or any disagreements were addressed in the HPI. MEDICAL HISTORY  Past Medical History:   Diagnosis Date    RLS (restless legs syndrome) 2/1/2013    Routine general medical examination at a health care facility 2/1/2013        SURGICAL HISTORY  History reviewed. No pertinent surgical history.     CURRENT MEDICATIONS  Discharge Medication List as of 12/26/2022  2:58 AM        CONTINUE these medications which have NOT CHANGED    Details   methocarbamol (ROBAXIN) 500 MG tablet Take 500 mg by mouth 4 times dailyHistorical Med      gabapentin (NEURONTIN) 600 MG tablet Take 0.5 tablets by mouth nightly as needed (low back / sciatica) for up to 30 days. , Disp-15 tablet, R-0Normal             ALLERGIES  Patient has no known allergies. FAMILYHISTORY  Family History   Problem Relation Age of Onset    Heart Disease Paternal Uncle        SOCIAL HISTORY  Social History     Tobacco Use    Smoking status: Every Day     Packs/day: 0.50     Types: Cigarettes    Smokeless tobacco: Never   Vaping Use    Vaping Use: Every day   Substance Use Topics    Alcohol use: No    Drug use: Yes     Types: Methamphetamines (Crystal Meth), Marijuana (Weed)       SCREENINGS    [place] Coma Scale  Eye Opening: Spontaneous  Best Verbal Response: Oriented  Best Motor Response: Obeys commands  [place] Coma Scale Score: 15       CIWA Assessment  BP: 133/86  Heart Rate: 82           REVIEW OF SYSTEMS:    Review of Systems   Constitutional:  Negative for activity change, appetite change and fever. HENT:  Negative for congestion and postnasal drip. Respiratory:  Negative for cough and shortness of breath. Cardiovascular:  Negative for chest pain and leg swelling. Gastrointestinal:  Negative for nausea and vomiting. Musculoskeletal:  Positive for arthralgias. Negative for joint swelling. Skin:  Negative for rash and wound. Neurological:  Negative for dizziness and light-headedness. Psychiatric/Behavioral:  Negative for behavioral problems. The patient is not nervous/anxious. Positives and Pertinent negatives as per HPI. PHYSICAL EXAM:     Vitals:    12/26/22 0037 12/26/22 0130 12/26/22 0230   BP: 120/70 135/86 133/86   Pulse: 95 90 82   Resp: 12 19 16   Temp:   97.8 °F (36.6 °C)   TempSrc:   Oral   SpO2: 100% 100% 100%   Weight: 136 lb (61.7 kg)     Height: 5' 9\" (1.753 m)         Physical Exam  Constitutional:       Appearance: Normal appearance. He is normal weight. He is not ill-appearing or diaphoretic. HENT:      Head: Normocephalic and atraumatic.       Right Ear: External ear normal.      Left Ear: External ear normal.      Nose: Nose normal.      Mouth/Throat:      Mouth: Mucous membranes are moist.      Pharynx: Oropharynx is clear. Eyes:      General:         Right eye: No discharge. Left eye: No discharge. Conjunctiva/sclera: Conjunctivae normal.   Cardiovascular:      Rate and Rhythm: Normal rate and regular rhythm. Pulmonary:      Effort: Pulmonary effort is normal. No respiratory distress. Breath sounds: Normal breath sounds. Abdominal:      General: Abdomen is flat. Palpations: Abdomen is soft. Musculoskeletal:         General: No swelling or tenderness. Arms:       Cervical back: Normal range of motion and neck supple. Right lower leg: No edema. Left lower leg: No edema. Comments: Tenderness to palpation along the left shoulder, left arm. Full range of motion. Pain elicited with pectoralis use with arm at 90 degree angle in abduction, with anterior rotation. No deformities. Full range of active motion. Skin:     General: Skin is warm and dry. Findings: No rash. Neurological:      General: No focal deficit present. Mental Status: He is alert and oriented to person, place, and time. Psychiatric:         Mood and Affect: Mood normal.         Thought Content: Thought content normal.       DIAGNOSTIC RESULTS:    LABS:    Labs Reviewed - No data to display    When ordered, only abnormal lab results are displayed. All other labs were within normal range or not returned as of this dictation. RADIOLOGY:    Non-plain film images such as CT, Ultrasound and MRI are read by the radiologist. Interpretation per the Radiologist below, if available at the time of this note:  No orders to display       PROCEDURES:  Unless otherwise noted below, none.     Procedures    MEDICATIONS:   Medications   lidocaine 4 % external patch 1 patch (1 patch TransDERmal Patch Applied 12/26/22 0228)   ketorolac (TORADOL) Details   lidocaine (LIDODERM) 5 % Place 1 patch onto the skin daily 12 hours on, 12 hours off., Disp-30 patch, R-0Normal             DISCONTINUED MEDICATIONS:  Discharge Medication List as of 12/26/2022  2:58 AM               DISPOSITION REFERRAL (if applicable):  [facility] [place] [facility]  [address] [facility]  [phone]    If symptoms worsen, As needed    Nuvia Curiel MD  [address] [place] [address]  [phone]    Schedule an appointment as soon as possible for a visit       Marianne Mace [facility], [address] [facility]  [address] [place] [address]  [phone]    Schedule an appointment as soon as possible for a visit         _____________________________________       Dinh Daly DO, am the primary attending of record and contributed the majority of evaluation and treatment of emergent care for this encounter. This chart was generated in part by using Dragon Dictation system and may contain errors related to that system including errors in grammar, punctuation, and spelling, as well as words and phrases that may be inappropriate. If there are any questions or concerns please feel free to contact the dictating provider for clarification.      LARRY DALY DO (electronically signed)   [address]  12/26/22 8619

## 2022-12-29 ENCOUNTER — OFFICE VISIT (OUTPATIENT)
Dept: PRIMARY CARE CLINIC | Age: 32
End: 2022-12-29
Payer: MEDICARE

## 2022-12-29 VITALS
BODY MASS INDEX: 21.92 KG/M2 | WEIGHT: 148 LBS | RESPIRATION RATE: 16 BRPM | OXYGEN SATURATION: 99 % | HEART RATE: 84 BPM | DIASTOLIC BLOOD PRESSURE: 60 MMHG | SYSTOLIC BLOOD PRESSURE: 122 MMHG | HEIGHT: 69 IN | TEMPERATURE: 97 F

## 2022-12-29 DIAGNOSIS — M62.89 MUSCLE TIGHTNESS: ICD-10-CM

## 2022-12-29 DIAGNOSIS — F15.93 WITHDRAWAL FROM OTHER STIMULANT DRUG (HCC): Primary | ICD-10-CM

## 2022-12-29 PROBLEM — F19.939 DRUG WITHDRAWAL SYNDROME (HCC): Status: ACTIVE | Noted: 2022-12-29

## 2022-12-29 PROCEDURE — 99213 OFFICE O/P EST LOW 20 MIN: CPT | Performed by: STUDENT IN AN ORGANIZED HEALTH CARE EDUCATION/TRAINING PROGRAM

## 2022-12-29 PROCEDURE — G8484 FLU IMMUNIZE NO ADMIN: HCPCS | Performed by: STUDENT IN AN ORGANIZED HEALTH CARE EDUCATION/TRAINING PROGRAM

## 2022-12-29 PROCEDURE — G8427 DOCREV CUR MEDS BY ELIG CLIN: HCPCS | Performed by: STUDENT IN AN ORGANIZED HEALTH CARE EDUCATION/TRAINING PROGRAM

## 2022-12-29 PROCEDURE — G8420 CALC BMI NORM PARAMETERS: HCPCS | Performed by: STUDENT IN AN ORGANIZED HEALTH CARE EDUCATION/TRAINING PROGRAM

## 2022-12-29 PROCEDURE — 4004F PT TOBACCO SCREEN RCVD TLK: CPT | Performed by: STUDENT IN AN ORGANIZED HEALTH CARE EDUCATION/TRAINING PROGRAM

## 2022-12-29 RX ORDER — METHOCARBAMOL 500 MG/1
500 TABLET, FILM COATED ORAL 3 TIMES DAILY
Qty: 90 TABLET | Refills: 0 | Status: SHIPPED | OUTPATIENT
Start: 2022-12-29

## 2022-12-29 SDOH — ECONOMIC STABILITY: FOOD INSECURITY: WITHIN THE PAST 12 MONTHS, THE FOOD YOU BOUGHT JUST DIDN'T LAST AND YOU DIDN'T HAVE MONEY TO GET MORE.: NEVER TRUE

## 2022-12-29 SDOH — ECONOMIC STABILITY: FOOD INSECURITY: WITHIN THE PAST 12 MONTHS, YOU WORRIED THAT YOUR FOOD WOULD RUN OUT BEFORE YOU GOT MONEY TO BUY MORE.: NEVER TRUE

## 2022-12-29 ASSESSMENT — PATIENT HEALTH QUESTIONNAIRE - PHQ9
SUM OF ALL RESPONSES TO PHQ QUESTIONS 1-9: 0
2. FEELING DOWN, DEPRESSED OR HOPELESS: 0
1. LITTLE INTEREST OR PLEASURE IN DOING THINGS: 0
SUM OF ALL RESPONSES TO PHQ QUESTIONS 1-9: 0
SUM OF ALL RESPONSES TO PHQ9 QUESTIONS 1 & 2: 0

## 2022-12-29 ASSESSMENT — ENCOUNTER SYMPTOMS
ABDOMINAL PAIN: 0
SHORTNESS OF BREATH: 0
WHEEZING: 0

## 2022-12-29 ASSESSMENT — SOCIAL DETERMINANTS OF HEALTH (SDOH): HOW HARD IS IT FOR YOU TO PAY FOR THE VERY BASICS LIKE FOOD, HOUSING, MEDICAL CARE, AND HEATING?: NOT HARD AT ALL

## 2022-12-29 NOTE — PROGRESS NOTES
Claude Mckeon (:  1990) is a 33 y.o. male,Established patient, here for evaluation of the following chief complaint(s): Other (Pt is c/o pain from the waist down x 2 days ), Joint Pain (Pt is c/o joint pain in both knees x 2 days. ), and Follow-Up from Hospital (Pt was seen at the emergency room on 2022 and 2022 for tachycardia. )      Patient presents today for same day visit complaining of joint pain and tachycardia. Patient was seen in the 2 times in the ED in  the past couple of days. First time because he was experiencing palpitations. The second time he went back due to left arm numbness. Patient was taking meth (last time was about a week ago). Stopped using meth, drinking redbull, smoking cigarettes (only smoking 2 cigarettes right now). He believes he was and still is experiencing withdrawal symptoms (tachycardia and knees pain, tightness). He is resistant to rehab because of negative experiences from the past.     Joint Pain   The pain is present in the left knee and right knee. This is a new problem. The current episode started yesterday. There has been no history of extremity trauma. The problem occurs constantly. The problem has been unchanged. The quality of the pain is described as sharp. The pain is at a severity of 4/10. The pain is moderate. Pertinent negatives include no fever. The symptoms are aggravated by activity and lying down. He has tried NSAIDS for the symptoms. The treatment provided no relief. unknown      ASSESSMENT/PLAN:  1. Withdrawal from other stimulant drug [facility])  Assessment & Plan:  Tachycardia, muscle tightness and joint pain are likely all withdrawal symptoms from drugs like meth. Patient was offered and highly suggested to take advantage of rehab, however he is resistant and wants to quit cold turkey.   2. Muscle tightness  Assessment & Plan:  Trial of muscle relaxer with an attempt to relax muscles, decrease tightness that could be caused by withdrawal symptoms. Orders:  -     methocarbamol (ROBAXIN) 500 MG tablet; Take 1 tablet by mouth 3 times daily, Disp-90 tablet, R-0Normal    Return in about 4 weeks (around 1/26/2023) for withdrawal follow up . SUBJECTIVE/OBJECTIVE:    Review of Systems   Constitutional:  Negative for fever. Respiratory:  Negative for shortness of breath and wheezing. Cardiovascular:  Negative for chest pain and palpitations. Gastrointestinal:  Negative for abdominal pain. Musculoskeletal:  Positive for arthralgias. Vitals: /60   Pulse 84   Temp 97 °F (36.1 °C)   Resp 16   Ht 5' 9\" (1.753 m)   Wt 148 lb (67.1 kg)   SpO2 99%   BMI 21.86 kg/m²   Physical Exam  Constitutional:       Appearance: Normal appearance. Cardiovascular:      Rate and Rhythm: Normal rate and regular rhythm. Pulses: Normal pulses. Heart sounds: Normal heart sounds. Pulmonary:      Effort: Pulmonary effort is normal.      Breath sounds: Normal breath sounds. Musculoskeletal:         General: No swelling or tenderness. Comments: No tenderness during exam   Neurological:      Mental Status: He is alert and oriented to person, place, and time. Psychiatric:         Mood and Affect: Mood normal.         Behavior: Behavior normal.         No results found for this visit on 12/29/22. An electronic signature was used to authenticate this note. Electronically signed by Spencer Escobar MD on 12/29/2022 at 9:56 PM     This dictation was generated by voice recognition computer software. Although all attempts are made to edit the dictation for accuracy, there may be errors in the transcription that are not intended.

## 2022-12-30 NOTE — ASSESSMENT & PLAN NOTE
Trial of muscle relaxer with an attempt to relax muscles, decrease tightness that could be caused by withdrawal symptoms. Well controlled.  Continue present management

## 2022-12-30 NOTE — ASSESSMENT & PLAN NOTE
Tachycardia, muscle tightness and joint pain are likely all withdrawal symptoms from drugs like meth. Patient was offered and highly suggested to take advantage of rehab, however he is resistant and wants to quit cold turkey.

## 2023-01-26 ENCOUNTER — OFFICE VISIT (OUTPATIENT)
Dept: PRIMARY CARE CLINIC | Age: 33
End: 2023-01-26
Payer: MEDICARE

## 2023-01-26 VITALS
OXYGEN SATURATION: 98 % | SYSTOLIC BLOOD PRESSURE: 120 MMHG | RESPIRATION RATE: 16 BRPM | HEIGHT: 69 IN | TEMPERATURE: 97.7 F | HEART RATE: 97 BPM | BODY MASS INDEX: 20.73 KG/M2 | DIASTOLIC BLOOD PRESSURE: 78 MMHG | WEIGHT: 140 LBS

## 2023-01-26 DIAGNOSIS — M54.41 CHRONIC MIDLINE LOW BACK PAIN WITH RIGHT-SIDED SCIATICA: ICD-10-CM

## 2023-01-26 DIAGNOSIS — G89.29 CHRONIC MIDLINE LOW BACK PAIN WITH RIGHT-SIDED SCIATICA: ICD-10-CM

## 2023-01-26 DIAGNOSIS — F41.9 ANXIETY: Primary | ICD-10-CM

## 2023-01-26 PROCEDURE — G8427 DOCREV CUR MEDS BY ELIG CLIN: HCPCS | Performed by: STUDENT IN AN ORGANIZED HEALTH CARE EDUCATION/TRAINING PROGRAM

## 2023-01-26 PROCEDURE — G8420 CALC BMI NORM PARAMETERS: HCPCS | Performed by: STUDENT IN AN ORGANIZED HEALTH CARE EDUCATION/TRAINING PROGRAM

## 2023-01-26 PROCEDURE — G8484 FLU IMMUNIZE NO ADMIN: HCPCS | Performed by: STUDENT IN AN ORGANIZED HEALTH CARE EDUCATION/TRAINING PROGRAM

## 2023-01-26 PROCEDURE — 4004F PT TOBACCO SCREEN RCVD TLK: CPT | Performed by: STUDENT IN AN ORGANIZED HEALTH CARE EDUCATION/TRAINING PROGRAM

## 2023-01-26 PROCEDURE — 99214 OFFICE O/P EST MOD 30 MIN: CPT | Performed by: STUDENT IN AN ORGANIZED HEALTH CARE EDUCATION/TRAINING PROGRAM

## 2023-01-26 RX ORDER — BUSPIRONE HYDROCHLORIDE 5 MG/1
5 TABLET ORAL 2 TIMES DAILY
Qty: 60 TABLET | Refills: 1 | Status: SHIPPED | OUTPATIENT
Start: 2023-01-26 | End: 2023-03-27

## 2023-01-26 ASSESSMENT — PATIENT HEALTH QUESTIONNAIRE - PHQ9
4. FEELING TIRED OR HAVING LITTLE ENERGY: 1
5. POOR APPETITE OR OVEREATING: 1
8. MOVING OR SPEAKING SO SLOWLY THAT OTHER PEOPLE COULD HAVE NOTICED. OR THE OPPOSITE, BEING SO FIGETY OR RESTLESS THAT YOU HAVE BEEN MOVING AROUND A LOT MORE THAN USUAL: 0
2. FEELING DOWN, DEPRESSED OR HOPELESS: 0
SUM OF ALL RESPONSES TO PHQ QUESTIONS 1-9: 5
SUM OF ALL RESPONSES TO PHQ QUESTIONS 1-9: 5
SUM OF ALL RESPONSES TO PHQ9 QUESTIONS 1 & 2: 0
SUM OF ALL RESPONSES TO PHQ QUESTIONS 1-9: 5
3. TROUBLE FALLING OR STAYING ASLEEP: 2
SUM OF ALL RESPONSES TO PHQ QUESTIONS 1-9: 5
9. THOUGHTS THAT YOU WOULD BE BETTER OFF DEAD, OR OF HURTING YOURSELF: 0
10. IF YOU CHECKED OFF ANY PROBLEMS, HOW DIFFICULT HAVE THESE PROBLEMS MADE IT FOR YOU TO DO YOUR WORK, TAKE CARE OF THINGS AT HOME, OR GET ALONG WITH OTHER PEOPLE: 1
6. FEELING BAD ABOUT YOURSELF - OR THAT YOU ARE A FAILURE OR HAVE LET YOURSELF OR YOUR FAMILY DOWN: 0
7. TROUBLE CONCENTRATING ON THINGS, SUCH AS READING THE NEWSPAPER OR WATCHING TELEVISION: 1
1. LITTLE INTEREST OR PLEASURE IN DOING THINGS: 0

## 2023-01-26 ASSESSMENT — ANXIETY QUESTIONNAIRES
7. FEELING AFRAID AS IF SOMETHING AWFUL MIGHT HAPPEN: 1
GAD7 TOTAL SCORE: 8
IF YOU CHECKED OFF ANY PROBLEMS ON THIS QUESTIONNAIRE, HOW DIFFICULT HAVE THESE PROBLEMS MADE IT FOR YOU TO DO YOUR WORK, TAKE CARE OF THINGS AT HOME, OR GET ALONG WITH OTHER PEOPLE: SOMEWHAT DIFFICULT
3. WORRYING TOO MUCH ABOUT DIFFERENT THINGS: 1
5. BEING SO RESTLESS THAT IT IS HARD TO SIT STILL: 1
2. NOT BEING ABLE TO STOP OR CONTROL WORRYING: 1
4. TROUBLE RELAXING: 1
1. FEELING NERVOUS, ANXIOUS, OR ON EDGE: 3
6. BECOMING EASILY ANNOYED OR IRRITABLE: 0

## 2023-01-26 ASSESSMENT — ENCOUNTER SYMPTOMS
ABDOMINAL PAIN: 0
SHORTNESS OF BREATH: 0
WHEEZING: 0

## 2023-01-26 NOTE — PATIENT INSTRUCTIONS
Dr. Miri Akbar  [facility] ([facility]) -- [facility]  [address], [address] [facility]    Phone: [phone]

## 2023-01-26 NOTE — PROGRESS NOTES
Patient:  Arnaud Mills 33 y.o. male     Date of Service: 1/26/2023       Chief complaint:   Chief Complaint   Patient presents with    Follow-up     Pt here for follow up visit - c/o increased stress and anxiety       History of Present Illness   Patient presents today complaining of anxiety. Anxiety: Patient complains of evaluation of anxiety disorder. He has the following anxiety symptoms: difficulty concentrating, dizziness, fatigue. He's always been an anxious person but it is gradually worsening recently. He denies current suicidal and homicidal ideation. Family history significant for schizophrenia and bipolar. Possible organic causes contributing are: drug abuse (history of meth, but quit several weeks ago). Risk factors: none. Previous treatment includes he does not remember. Low back pain with sciatica: Patient has had this problem for a long time, comes and goes. Pain sometimes is in the posterior of his right thigh, other times radiating from low back to behind the right knee. BRAIN-7 suggestive of mild anxiety. BRAIN 7 SCORE 1/26/2023   BRAIN-7 Total Score 8       PHQ Scores 1/26/2023 12/29/2022 9/16/2021   PHQ2 Score 0 0 0   PHQ9 Score 5 0 0         Reviewof Systems:   Review of Systems   Constitutional:  Negative for fever. Respiratory:  Negative for shortness of breath and wheezing. Cardiovascular:  Negative for chest pain and palpitations. Gastrointestinal:  Negative for abdominal pain. Psychiatric/Behavioral:  The patient is nervous/anxious. Physical Exam   Vitals: /78   Pulse 97   Temp 97.7 °F (36.5 °C)   Resp 16   Ht 5' 9\" (1.753 m)   Wt 140 lb (63.5 kg)   SpO2 98%   BMI 20.67 kg/m²   Physical Exam  Constitutional:       Appearance: Normal appearance. Cardiovascular:      Rate and Rhythm: Normal rate and regular rhythm. Pulses: Normal pulses. Heart sounds: Normal heart sounds.    Pulmonary:      Effort: Pulmonary effort is normal. Breath sounds: Normal breath sounds. Musculoskeletal:      Comments: Mild tenderness to palpation on and SI joint   Neurological:      Mental Status: He is alert and oriented to person, place, and time. Psychiatric:         Mood and Affect: Mood normal.         Behavior: Behavior normal.          No results found for this visit on 01/26/23. Assessment and Plan   1. Anxiety  Assessment & Plan:  Uncontrolled. Patient agreeable for trial of buspirone. He was also instructed to establish care with therapist.  Orders:  -     busPIRone (BUSPAR) 5 MG tablet; Take 1 tablet by mouth 2 times daily, Disp-60 tablet, R-1Normal  2. Chronic midline low back pain with right-sided sciatica  Assessment & Plan:   Patient referred to [address] [place] [address] for further evaluation and management. Orders:  -     [address] , [place], Orthopedic Surgery (Spine), [facility]    Issues to address at future visit/s:     Return to Office: Return in about 6 weeks (around 3/9/2023) for for anxiety follow up (either with Dr. Vinod Carreon or another doctor of his choice). Medication List:    Current Outpatient Medications   Medication Sig Dispense Refill    busPIRone (BUSPAR) 5 MG tablet Take 1 tablet by mouth 2 times daily 60 tablet 1     No current facility-administered medications for this visit. Electronically signed by Molly Carlson MD on 1/26/2023 at 4:00 PM     This dictation was generated by voice recognition computer software. Although all attempts are made to edit the dictation for accuracy, there may be errors in the transcription that are not intended.

## 2023-01-26 NOTE — ASSESSMENT & PLAN NOTE
Uncontrolled. Patient agreeable for trial of buspirone.   He was also instructed to establish care with therapist.

## 2023-02-14 ENCOUNTER — HOSPITAL ENCOUNTER (EMERGENCY)
Age: 33
Discharge: HOME | End: 2023-02-14
Payer: MEDICAID

## 2023-02-14 ENCOUNTER — APPOINTMENT (OUTPATIENT)
Dept: CT IMAGING | Age: 33
End: 2023-02-14
Payer: MEDICAID

## 2023-02-14 VITALS
OXYGEN SATURATION: 98 % | BODY MASS INDEX: 22.22 KG/M2 | HEART RATE: 91 BPM | SYSTOLIC BLOOD PRESSURE: 133 MMHG | HEIGHT: 69 IN | TEMPERATURE: 97.3 F | RESPIRATION RATE: 19 BRPM | DIASTOLIC BLOOD PRESSURE: 75 MMHG | WEIGHT: 150 LBS

## 2023-02-14 DIAGNOSIS — G44.209 TENSION HEADACHE: Primary | ICD-10-CM

## 2023-02-14 PROCEDURE — 99284 EMERGENCY DEPT VISIT MOD MDM: CPT

## 2023-02-14 PROCEDURE — 70450 CT HEAD/BRAIN W/O DYE: CPT

## 2023-02-14 PROCEDURE — 6370000000 HC RX 637 (ALT 250 FOR IP): Performed by: PHYSICIAN ASSISTANT

## 2023-02-14 RX ORDER — BUTALBITAL, ACETAMINOPHEN AND CAFFEINE 50; 325; 40 MG/1; MG/1; MG/1
1 TABLET ORAL ONCE
Status: COMPLETED | OUTPATIENT
Start: 2023-02-14 | End: 2023-02-14

## 2023-02-14 RX ORDER — IBUPROFEN 600 MG/1
600 TABLET ORAL ONCE
Status: COMPLETED | OUTPATIENT
Start: 2023-02-14 | End: 2023-02-14

## 2023-02-14 RX ORDER — IBUPROFEN 600 MG/1
600 TABLET ORAL EVERY 8 HOURS PRN
Qty: 30 TABLET | Refills: 0 | Status: SHIPPED | OUTPATIENT
Start: 2023-02-14

## 2023-02-14 RX ORDER — BUTALBITAL, ACETAMINOPHEN AND CAFFEINE 50; 325; 40 MG/1; MG/1; MG/1
1 TABLET ORAL EVERY 6 HOURS PRN
Qty: 12 TABLET | Refills: 0 | Status: SHIPPED | OUTPATIENT
Start: 2023-02-14

## 2023-02-14 RX ADMIN — IBUPROFEN 600 MG: 600 TABLET, FILM COATED ORAL at 21:32

## 2023-02-14 RX ADMIN — BUTALBITAL, ACETAMINOPHEN, AND CAFFEINE 1 TABLET: 50; 325; 40 TABLET ORAL at 21:32

## 2023-02-14 ASSESSMENT — PAIN SCALES - GENERAL: PAINLEVEL_OUTOF10: 6

## 2023-02-14 ASSESSMENT — PAIN - FUNCTIONAL ASSESSMENT: PAIN_FUNCTIONAL_ASSESSMENT: 0-10

## 2023-02-15 ENCOUNTER — HOSPITAL ENCOUNTER (EMERGENCY)
Age: 33
Discharge: HOME | End: 2023-02-15
Attending: EMERGENCY MEDICINE
Payer: MEDICAID

## 2023-02-15 VITALS
RESPIRATION RATE: 16 BRPM | HEIGHT: 68 IN | TEMPERATURE: 97.6 F | OXYGEN SATURATION: 100 % | WEIGHT: 155 LBS | HEART RATE: 78 BPM | SYSTOLIC BLOOD PRESSURE: 131 MMHG | BODY MASS INDEX: 23.49 KG/M2 | DIASTOLIC BLOOD PRESSURE: 72 MMHG

## 2023-02-15 DIAGNOSIS — R51.9 ACUTE NONINTRACTABLE HEADACHE, UNSPECIFIED HEADACHE TYPE: ICD-10-CM

## 2023-02-15 DIAGNOSIS — R19.7 DIARRHEA, UNSPECIFIED TYPE: Primary | ICD-10-CM

## 2023-02-15 PROCEDURE — 99283 EMERGENCY DEPT VISIT LOW MDM: CPT

## 2023-02-15 PROCEDURE — 6370000000 HC RX 637 (ALT 250 FOR IP): Performed by: EMERGENCY MEDICINE

## 2023-02-15 RX ORDER — ACETAMINOPHEN 500 MG
1000 TABLET ORAL ONCE
Status: COMPLETED | OUTPATIENT
Start: 2023-02-15 | End: 2023-02-15

## 2023-02-15 RX ADMIN — ACETAMINOPHEN 1000 MG: 500 TABLET ORAL at 04:42

## 2023-02-15 ASSESSMENT — PAIN - FUNCTIONAL ASSESSMENT: PAIN_FUNCTIONAL_ASSESSMENT: 0-10

## 2023-02-15 ASSESSMENT — PAIN DESCRIPTION - DESCRIPTORS
DESCRIPTORS: ACHING
DESCRIPTORS: ACHING

## 2023-02-15 ASSESSMENT — PAIN SCALES - GENERAL
PAINLEVEL_OUTOF10: 5
PAINLEVEL_OUTOF10: 4

## 2023-02-15 ASSESSMENT — PAIN DESCRIPTION - PAIN TYPE: TYPE: ACUTE PAIN

## 2023-02-15 ASSESSMENT — PAIN DESCRIPTION - LOCATION
LOCATION: HEAD
LOCATION: HEAD

## 2023-02-15 ASSESSMENT — PAIN DESCRIPTION - FREQUENCY: FREQUENCY: CONTINUOUS

## 2023-02-15 NOTE — ED PROVIDER NOTES
[address] [facility]  EMERGENCY DEPARTMENT ENCOUNTER        Pt Name: Ivette Acosta  MRN: [id number]  [place] 1990  Date of evaluation: 2/14/2023  Provider: Ric Valdez PA-C  PCP: Denice Ordoñez MD  Note Started: 9:14 PM EST 2/14/23      KANG. I have evaluated this patient. My supervising physician was available for consultation. CHIEF COMPLAINT       Chief Complaint   Patient presents with    Headache     Patient comes in due to a headache he has had all day. Patient also requesting a scan due to \"I have been exposed to infrared light\"        HISTORY OF PRESENT ILLNESS: 1 or more Elements     History From: Patient    Limitations to history : None    Ivette Acosta is a 33 y.o. male who presents to the emergency department with complaint headache. Onset this morning. States feels bandlike sensation around his head down on his neck. No visual change. Throbbing-like character. No light or sound sensitivity. Not worst headache of life. Gradual onset. Not thunderclap in origin. He has had no medication today. He does drink red bull throughout the day there may be a factor with regard to him indicating that he can feel his heart rate at times. He is worried that he might have been exposed to infrared light and this might be a cause to his headache. He reports no trauma. No respiratory illness. No fevers or chills. Nursing Notes were all reviewed and agreed with or any disagreements were addressed in the HPI. REVIEW OF SYSTEMS :      Review of Systems    Positives and Pertinent negatives as per HPI. SURGICAL HISTORY   No past surgical history on file.     [address]       Discharge Medication List as of 2/14/2023 10:30 PM        CONTINUE these medications which have NOT CHANGED    Details   busPIRone (BUSPAR) 5 MG tablet Take 1 tablet by mouth 2 times daily, Disp-60 tablet, R-1Normal             ALLERGIES     Patient has no known allergies. FAMILYHISTORY       Family History   Problem Relation Age of Onset    Heart Disease Paternal Uncle         SOCIAL HISTORY       Social History     Tobacco Use    Smoking status: Every Day     Packs/day: 0.50     Types: Cigarettes    Smokeless tobacco: Never   Vaping Use    Vaping Use: Every day   Substance Use Topics    Alcohol use: No    Drug use: Yes     Types: Methamphetamines (Crystal Meth), Marijuana (Weed)       SCREENINGS                         CIWA Assessment  BP: 133/75  Heart Rate: 91           PHYSICAL EXAM  1 or more Elements     ED Triage Vitals [02/14/23 1831]   BP Temp Temp Source Heart Rate Resp SpO2 Height Weight   133/75 97.3 °F (36.3 °C) Oral 91 19 98 % 5' 9\" (1.753 m) 150 lb (68 kg)       Physical Exam  Vitals and nursing note reviewed. Constitutional:       Appearance: Normal appearance. He is well-developed and normal weight. HENT:      Head: Normocephalic and atraumatic. Right Ear: Tympanic membrane, ear canal and external ear normal.      Left Ear: Tympanic membrane, ear canal and external ear normal.   Eyes:      General: No scleral icterus. Right eye: No discharge. Left eye: No discharge. Conjunctiva/sclera: Conjunctivae normal.      Pupils: Pupils are equal, round, and reactive to light. Cardiovascular:      Rate and Rhythm: Normal rate and regular rhythm. Heart sounds: Normal heart sounds. Pulmonary:      Effort: Pulmonary effort is normal.      Breath sounds: Normal breath sounds. Abdominal:      Palpations: Abdomen is soft. Musculoskeletal:         General: Normal range of motion. Cervical back: Normal range of motion and neck supple. No rigidity or tenderness. Skin:     General: Skin is warm and dry. Neurological:      General: No focal deficit present. Mental Status: He is alert and oriented to person, place, and time. Mental status is at baseline.    Psychiatric:         Mood and Affect: Mood normal. Behavior: Behavior normal.         Thought Content: Thought content normal.         Judgment: Judgment normal.       DIAGNOSTIC RESULTS   LABS:    Labs Reviewed - No data to display    When ordered only abnormal lab results are displayed. All other labs were within normal range or not returned as of this dictation. EKG: When ordered, EKG's are interpreted by the Emergency Department Physician in the absence of a cardiologist.  Please see their note for interpretation of EKG. RADIOLOGY:   Non-plain film images such as CT, Ultrasound and MRI are read by the radiologist. Plain radiographic images are visualized and preliminarily interpreted by the ED Provider with the below findings:    CT head showing no intracranial or extracranial abnormalities as interpreted and read by the radiologist.    Interpretation per the Radiologist below, if available at the time of this note:    CT Head W/O Contrast   Final Result   No acute intracranial abnormality. CT Head W/O Contrast    Result Date: 2/14/2023  EXAMINATION: CT OF THE HEAD WITHOUT CONTRAST  2/14/2023 8:24 pm TECHNIQUE: CT of the head was performed without the administration of intravenous contrast. Automated exposure control, iterative reconstruction, and/or weight based adjustment of the mA/kV was utilized to reduce the radiation dose to as low as reasonably achievable. COMPARISON: None. HISTORY: ORDERING SYSTEM PROVIDED HISTORY: Persisting headache TECHNOLOGIST PROVIDED HISTORY: Reason for exam:->Persisting headache Has a \"code stroke\" or \"stroke alert\" been called? ->No Decision Support Exception - unselect if not a suspected or confirmed emergency medical condition->Emergency Medical Condition (MA) Reason for Exam: Persisting headache with ringing in ears FINDINGS: BRAIN/VENTRICLES: There is no acute intracranial hemorrhage, mass effect or midline shift. No abnormal extra-axial fluid collection.   The gray-white differentiation is maintained without evidence of an acute infarct. There is no evidence of hydrocephalus. ORBITS: The visualized portion of the orbits demonstrate no acute abnormality. SINUSES: The visualized paranasal sinuses and mastoid air cells demonstrate no acute abnormality. Small left ethmoid mucosal retention cyst is incidentally noted. SOFT TISSUES/SKULL:  No acute abnormality of the visualized skull or soft tissues. No acute intracranial abnormality. No results found. PROCEDURES   Unless otherwise noted below, none     Procedures    CRITICAL CARE TIME (.cctime)       PAST MEDICAL HISTORY      has a past medical history of RLS (restless legs syndrome) (2/1/2013) and Routine general medical examination at a health care facility (2/1/2013). EMERGENCY DEPARTMENT COURSE and DIFFERENTIAL DIAGNOSIS/MDM:   Vitals:    Vitals:    02/14/23 1831   BP: 133/75   Pulse: 91   Resp: 19   Temp: 97.3 °F (36.3 °C)   TempSrc: Oral   SpO2: 98%   Weight: 150 lb (68 kg)   Height: 5' 9\" (1.753 m)       Patient was given the following medications:  Medications   butalbital-acetaminophen-caffeine (FIORICET, ESGIC) per tablet 1 tablet (1 tablet Oral Given 2/14/23 2132)   ibuprofen (ADVIL;MOTRIN) tablet 600 mg (600 mg Oral Given 2/14/23 2132)             Is this patient to be included in the SEP-1 Core Measure due to severe sepsis or septic shock? No   Exclusion criteria - the patient is NOT to be included for SEP-1 Core Measure due to: Infection is not suspected    Chronic Conditions affecting care: None   has a past medical history of RLS (restless legs syndrome) (2/1/2013) and Routine general medical examination at a health care facility (2/1/2013). CONSULTS: (Who and What was discussed)  None    Social Determinants Significantly Affecting Health : None    Records Reviewed (External and Source) none    CC/HPI Summary, DDx, ED Course, and Reassessment:     Presenting with headache.   States frontal bitemporal banding around the head and somewhat neck tightness. He does have good range of motion of the neck. CT scan obtained showing no intracranial abnormalities. Patient was given Fioricet No. 1 and ibuprofen 600 mg. Headache has resolved. The patient is safe to be discharged home. I believe he is a tension headache. I did send prescription for Fioricet and ibuprofen to his local pharmacy. Disposition Considerations (tests considered but not done, Admit vs D/C, Shared Decision Making, Pt Expectation of Test or Tx.):     This patient presenting with headache. Fioricet and Motrin given. Headache resolved. CT scan negative. Low concern for an acute process such as meningitis or cranial abnormality. I did send prescription for Fioricet and ibuprofen to his local pharmacy. I recommended he follow-up with his PCP for reevaluation next couple of days. I am the Primary Clinician of Record. FINAL IMPRESSION      1.  Tension headache          DISPOSITION/PLAN     DISPOSITION Decision To Discharge 02/14/2023 10:11:29 PM      PATIENT REFERRED TO:  Mary Ellen Rodriguez MD  [address] [facility]  [facility]  [phone]    Schedule an appointment as soon as possible for a visit in 2 days      [facility] ED  [address]  [phone]    If symptoms worsen    DISCHARGE MEDICATIONS:  Discharge Medication List as of 2/14/2023 10:30 PM        START taking these medications    Details   butalbital-acetaminophen-caffeine (FIORICET, ESGIC) -40 MG per tablet Take 1 tablet by mouth every 6 hours as needed for Headaches or Migraine, Disp-12 tablet, R-0Normal      ibuprofen (ADVIL;MOTRIN) 600 MG tablet Take 1 tablet by mouth every 8 hours as needed for Pain, Disp-30 tablet, R-0Normal             DISCONTINUED MEDICATIONS:  Discharge Medication List as of 2/14/2023 10:30 PM                 (Please note that portions of this note were completed with a voice recognition program.  Efforts were made to edit the dictations but occasionally words are mis-transcribed. )    Sebastian Olivera PA-C (electronically signed)      Sebastian Olivera PA-C  02/14/23 4075

## 2023-02-15 NOTE — DISCHARGE INSTRUCTIONS
CT scan of your head was normal.  No evidence of brain abnormality or skull abnormality. I gave you Fioricet and Motrin tablet. Headache improves. I sent prescription both Fioricet and Motrin to your local [facility] pharmacy on [facility].

## 2023-02-15 NOTE — DISCHARGE INSTRUCTIONS
We reviewed the CAT scan you had earlier today. Your exam was reassuring here. You were given Tylenol for your headache. Since you have only had 1 episode of diarrhea this is likely self-limited and may be related to something you ate. Drink plenty of fluids at home to stay hydrated. Please follow-up with your primary doctor within the next several days.

## 2023-02-16 NOTE — ED PROVIDER NOTES
[facility] Emergency Department      CHIEF COMPLAINT  Diarrhea [place] Fought to [place] ED yesterday for same issue. States all they did was give him ibuprofen. States he is still having diarrhea, chills and headache. )      HISTORY OF PRESENT ILLNESS  Carla Thomas is a 33 y.o. male with a history of restless leg syndrome presents with complaint of a headache and 1 episode of diarrhea. He was seen at [facility] earlier today stating he had headache for 24 hours. He had not taken anything at home for the pain. He was there complaining that he was worried he was exposed to infrared light and that could be causing his headache. Given the bizarre complaint he did have a head CT performed which was normal.  He was offered reassurance. He again is questioning that now wondering if he had infrared light exposure. He also states he is worried that he had radiation exposure. I question him on this and he has no exposure other than the CT scan he had earlier today. He states he then had an episode of diarrhea 30 minutes prior to arrival that was nonbloody. He states that is when he is decided to come back to the ER. He is not having nausea or vomiting. No weakness or numbness of extremities. No abdominal pain. No fevers. No chest pain or shortness of breath. No neck pain or stiffness. Headache was gradual in onset and is diffuse. No vision changes. .   No other complaints, modifying factors or associated symptoms. History obtained from the patient. I have reviewed the following from the nursing documentation. Past Medical History:   Diagnosis Date    RLS (restless legs syndrome) 2/1/2013    Routine general medical examination at a health care facility 2/1/2013     History reviewed. No pertinent surgical history.   Family History   Problem Relation Age of Onset    Heart Disease Paternal Uncle      Social History     Socioeconomic History    Marital status: Single     Spouse name: Not on file    Number of children: Not on file    Years of education: Not on file    Highest education level: Not on file   Occupational History    Not on file   Tobacco Use    Smoking status: Every Day     Packs/day: 0.50     Types: Cigarettes    Smokeless tobacco: Never   Vaping Use    Vaping Use: Former   Substance and Sexual Activity    Alcohol use: No    Drug use: Yes     Types: Methamphetamines (Crystal Meth), Marijuana ([employer]), Cocaine    Sexual activity: Not on file   Other Topics Concern    Not on file   Social History Narrative    Not on file     Social Determinants of Health     Financial Resource Strain: Low Risk     Difficulty of Paying Living Expenses: Not hard at all   Food Insecurity: No Food Insecurity    Worried About Running Out of Food in the Last Year: Never true    Ran Out of Food in the Last Year: Never true   Transportation Needs: Not on file   Physical Activity: Not on file   Stress: Not on file   Social Connections: Not on file   Intimate Partner Violence: Not on file   Housing Stability: Not on file     No current facility-administered medications for this encounter.      Current Outpatient Medications   Medication Sig Dispense Refill    butalbital-acetaminophen-caffeine (FIORICET, ESGIC) -40 MG per tablet Take 1 tablet by mouth every 6 hours as needed for Headaches or Migraine 12 tablet 0    ibuprofen (ADVIL;MOTRIN) 600 MG tablet Take 1 tablet by mouth every 8 hours as needed for Pain 30 tablet 0    busPIRone (BUSPAR) 5 MG tablet Take 1 tablet by mouth 2 times daily 60 tablet 1     No Known Allergies    REVIEW OF SYSTEMS    Unless otherwise stated in this report, this patient's positive and negative responses for review of systems (constitutional, eyes, ENT, cardiovascular, respiratory, gastrointestinal, neurological, genitourinary, musculoskeletal, integument systems and systems related to the presenting problem) are either stated in the preceding paragraph, were not pertinent or were negative for the symptoms and/or complaints related to the medical problem. PHYSICAL EXAM  /72   Pulse 78   Temp 97.6 °F (36.4 °C) (Oral)   Resp 16   Ht 5' 8\" (1.727 m)   Wt 155 lb (70.3 kg)   SpO2 100%   BMI 23.57 kg/m²   GENERAL APPEARANCE: Awake and alert. Cooperative. No acute distress. HEAD: Normocephalic. Atraumatic. EYES: PERRL. EOM's grossly intact. ENT: Mucous membranes are moist.   NECK: Supple, trachea midline. HEART: RRR. LUNGS: Respirations unlabored. CTAB. Good air exchange. No wheezes, rales, or rhonchi. Speaking comfortably in full sentences. ABDOMEN:  Soft. Non-distended. Non-tender. No guarding or rebound. EXTREMITIES: No peripheral edema. MAEE. No acute deformities. SKIN: Warm, dry and intact. No acute rashes. NEUROLOGICAL: Alert and oriented X 3. CN II-XII grossly intact. Strength 5/5, sensation intact. NIH stroke scale equals 0. PSYCHIATRIC: Normal mood and affect. LABS  I have reviewed all labs for this visit. No results found for this visit on 02/15/23. RADIOLOGY  X-RAYS: ALL IMAGES INCLUDING PLAIN FILMS, CT, ULTRASOUND AND MRI HAVE BEEN READ BY THE RADIOLOGIST. No orders to display            I have personally reviewed Xray and Ultrasound images and radiology confirms the interpretation:  None. Medications administered. (Dose and Route): Tylenol 1000 mg by mouth. Sepsis:  Is this patient to be included in the SEP-1 Core Measure due to severe sepsis or septic shock? No   Exclusion criteria - the patient is NOT to be included for SEP-1 Core Measure due to: Infection is not suspected             ED COURSE/MDM:  Patient seen and evaluated. Here the patient is afebrile with normal vitals signs. Old records reviewed: None. Diagnoses affirmatively addressed, consideration of tests, treatments & admission, including shared decision making:    The patient is well-appearing.   I reviewed his chart from [facility] earlier today when he had a normal head CT. He presented with similar complaints of headache being concern that it is caused from radiation exposure or infrared light exposure. He did not complain of diarrhea there but again tells me that his diarrhea only started 30 minutes prior to arrival and he had one episode. He has a normal neurologic exam, NIH stroke scale is 0. He has a soft, nontender abdomen. No meningismus. Headache was gradual in onset and is not severe. I do not suspect subarachnoid hemorrhage or meningitis. He has somewhat of an odd affect and again is perseverating about radiation exposure and being concerned about this. I questioned him about radiation exposure and he has none other than the CT scan he had earlier today. I offered reassurance to him. He was given Tylenol here for the headache. I do not think he needs further work-up given his normal exam.  He is appropriate for outpatient primary care follow-up. Consultation summary: None. Social determinants of health: None. Labs and imaging reviewed and results discussed with patient. Patient was reassessed as noted above . Plan of care discussed with patient. Patient in agreement with plan. Strict return precautions have been given. Patient was given scripts for the following medications. I counseled patient how to take these medications. Discharge Medication List as of 2/15/2023  4:46 AM      None. CLINICAL IMPRESSION  1. Diarrhea, unspecified type    2. Acute nonintractable headache, unspecified headache type        Blood pressure 131/72, pulse 78, temperature 97.6 °F (36.4 °C), temperature source Oral, resp. rate 16, height 5' 8\" (1.727 m), weight 155 lb (70.3 kg), SpO2 100 %. DISPOSITION  Sharon Ramos was discharged to home in stable condition. Amanda Rodriguez MD am the primary clinician of record.     (Please note this note was completed with a voice recognition program.  Efforts were made to edit the dictations but occasionally words are mis-transcribed.)        Suzi Field MD  02/16/23 [id number]

## 2023-02-25 ENCOUNTER — APPOINTMENT (OUTPATIENT)
Dept: GENERAL RADIOLOGY | Age: 33
End: 2023-02-25
Payer: MEDICAID

## 2023-02-25 ENCOUNTER — HOSPITAL ENCOUNTER (EMERGENCY)
Age: 33
Discharge: HOME | End: 2023-02-25
Attending: EMERGENCY MEDICINE
Payer: MEDICAID

## 2023-02-25 VITALS
DIASTOLIC BLOOD PRESSURE: 93 MMHG | HEIGHT: 69 IN | RESPIRATION RATE: 20 BRPM | SYSTOLIC BLOOD PRESSURE: 155 MMHG | OXYGEN SATURATION: 98 % | HEART RATE: 114 BPM | WEIGHT: 150 LBS | TEMPERATURE: 97.8 F | BODY MASS INDEX: 22.22 KG/M2

## 2023-02-25 DIAGNOSIS — M54.50 LOW BACK PAIN, UNSPECIFIED BACK PAIN LATERALITY, UNSPECIFIED CHRONICITY, UNSPECIFIED WHETHER SCIATICA PRESENT: Primary | ICD-10-CM

## 2023-02-25 PROCEDURE — 99283 EMERGENCY DEPT VISIT LOW MDM: CPT

## 2023-02-25 PROCEDURE — 71045 X-RAY EXAM CHEST 1 VIEW: CPT

## 2023-02-25 RX ORDER — IBUPROFEN 600 MG/1
600 TABLET ORAL 4 TIMES DAILY PRN
Qty: 40 TABLET | Refills: 0 | Status: SHIPPED | OUTPATIENT
Start: 2023-02-25

## 2023-02-25 RX ORDER — FAMOTIDINE 20 MG/1
20 TABLET, FILM COATED ORAL 2 TIMES DAILY
Qty: 60 TABLET | Refills: 0 | Status: SHIPPED | OUTPATIENT
Start: 2023-02-25

## 2023-02-25 ASSESSMENT — PAIN SCALES - GENERAL: PAINLEVEL_OUTOF10: 3

## 2023-02-25 ASSESSMENT — PAIN DESCRIPTION - LOCATION: LOCATION: BACK

## 2023-02-25 ASSESSMENT — PAIN - FUNCTIONAL ASSESSMENT: PAIN_FUNCTIONAL_ASSESSMENT: 0-10

## 2023-02-26 NOTE — ED NOTES
Pt ok to d/c to home. Pt given d/c instructions. Pt verbalized understating including Rx and follow up care. Pt ambulated to lobby for ride home.  0 s/s of distress at time of d/c.          Lucas Guerrier RN  02/25/23 [id number]
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
## 2023-03-14 ENCOUNTER — HOSPITAL ENCOUNTER (EMERGENCY)
Age: 33
Discharge: HOME | End: 2023-03-14
Payer: MEDICAID

## 2023-03-14 VITALS
TEMPERATURE: 98.1 F | DIASTOLIC BLOOD PRESSURE: 82 MMHG | WEIGHT: 150 LBS | HEART RATE: 100 BPM | HEIGHT: 69 IN | BODY MASS INDEX: 22.22 KG/M2 | SYSTOLIC BLOOD PRESSURE: 156 MMHG | OXYGEN SATURATION: 99 % | RESPIRATION RATE: 14 BRPM

## 2023-03-14 DIAGNOSIS — Z71.1 FEARED CONDITION NOT DEMONSTRATED: Primary | ICD-10-CM

## 2023-03-14 PROCEDURE — 99282 EMERGENCY DEPT VISIT SF MDM: CPT

## 2023-03-14 ASSESSMENT — PAIN - FUNCTIONAL ASSESSMENT: PAIN_FUNCTIONAL_ASSESSMENT: NONE - DENIES PAIN
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
## 2023-03-14 NOTE — ED NOTES
SARBJIT Vazquez was at bedside to speak to pt. RN reviewed AVS with pt who denies further concerns at this time.      Ange Arceo RN  03/14/23 [id number]

## 2023-04-01 DIAGNOSIS — F41.9 ANXIETY: ICD-10-CM

## 2023-04-03 RX ORDER — BUSPIRONE HYDROCHLORIDE 5 MG/1
TABLET ORAL
Qty: 60 TABLET | Refills: 1 | OUTPATIENT
Start: 2023-04-03

## 2023-05-02 ENCOUNTER — HOSPITAL ENCOUNTER (EMERGENCY)
Age: 33
Discharge: LEFT BEFORE DISCHARGE | End: 2023-05-02

## 2023-05-02 VITALS
DIASTOLIC BLOOD PRESSURE: 62 MMHG | HEART RATE: 81 BPM | BODY MASS INDEX: 22.89 KG/M2 | SYSTOLIC BLOOD PRESSURE: 118 MMHG | OXYGEN SATURATION: 100 % | RESPIRATION RATE: 18 BRPM | WEIGHT: 155 LBS | TEMPERATURE: 97.9 F

## 2023-05-02 NOTE — ED NOTES
Called out in lobby and checked outside, pt not present at this time.      Amaris Hsieh RN  05/02/23 2371